# Patient Record
Sex: MALE | Race: BLACK OR AFRICAN AMERICAN | NOT HISPANIC OR LATINO | URBAN - METROPOLITAN AREA
[De-identification: names, ages, dates, MRNs, and addresses within clinical notes are randomized per-mention and may not be internally consistent; named-entity substitution may affect disease eponyms.]

---

## 2017-07-18 ENCOUNTER — INPATIENT (INPATIENT)
Facility: HOSPITAL | Age: 27
LOS: 4 days | Discharge: HOME | End: 2017-07-23
Attending: HOSPITALIST | Admitting: HOSPITALIST
Payer: COMMERCIAL

## 2017-07-18 VITALS
SYSTOLIC BLOOD PRESSURE: 96 MMHG | HEART RATE: 98 BPM | TEMPERATURE: 100 F | DIASTOLIC BLOOD PRESSURE: 57 MMHG | OXYGEN SATURATION: 95 % | RESPIRATION RATE: 18 BRPM

## 2017-07-18 DIAGNOSIS — J18.9 PNEUMONIA, UNSPECIFIED ORGANISM: ICD-10-CM

## 2017-07-18 DIAGNOSIS — Z98.890 OTHER SPECIFIED POSTPROCEDURAL STATES: Chronic | ICD-10-CM

## 2017-07-18 DIAGNOSIS — A41.9 SEPSIS, UNSPECIFIED ORGANISM: ICD-10-CM

## 2017-07-18 LAB
ALBUMIN SERPL ELPH-MCNC: 4.3 G/DL — SIGNIFICANT CHANGE UP (ref 3.3–5)
ALP SERPL-CCNC: 63 U/L — SIGNIFICANT CHANGE UP (ref 40–120)
ALT FLD-CCNC: 29 U/L — SIGNIFICANT CHANGE UP (ref 4–41)
APTT BLD: 36.6 SEC — SIGNIFICANT CHANGE UP (ref 27.5–37.4)
AST SERPL-CCNC: 31 U/L — SIGNIFICANT CHANGE UP (ref 4–40)
B PERT DNA SPEC QL NAA+PROBE: SIGNIFICANT CHANGE UP
BASE EXCESS BLDV CALC-SCNC: -0.7 MMOL/L — SIGNIFICANT CHANGE UP
BASE EXCESS BLDV CALC-SCNC: 3.9 MMOL/L — SIGNIFICANT CHANGE UP
BASOPHILS # BLD AUTO: 0.09 K/UL — SIGNIFICANT CHANGE UP (ref 0–0.2)
BASOPHILS NFR BLD AUTO: 0.3 % — SIGNIFICANT CHANGE UP (ref 0–2)
BASOPHILS NFR SPEC: 0 % — SIGNIFICANT CHANGE UP (ref 0–2)
BILIRUB SERPL-MCNC: 3.2 MG/DL — HIGH (ref 0.2–1.2)
BLOOD GAS VENOUS - CREATININE: 1.69 MG/DL — HIGH (ref 0.5–1.3)
BLOOD GAS VENOUS - CREATININE: 1.86 MG/DL — HIGH (ref 0.5–1.3)
BUN SERPL-MCNC: 23 MG/DL — SIGNIFICANT CHANGE UP (ref 7–23)
C PNEUM DNA SPEC QL NAA+PROBE: NOT DETECTED — SIGNIFICANT CHANGE UP
CALCIUM SERPL-MCNC: 9.6 MG/DL — SIGNIFICANT CHANGE UP (ref 8.4–10.5)
CHLORIDE BLDV-SCNC: 102 MMOL/L — SIGNIFICANT CHANGE UP (ref 96–108)
CHLORIDE BLDV-SCNC: 104 MMOL/L — SIGNIFICANT CHANGE UP (ref 96–108)
CHLORIDE SERPL-SCNC: 93 MMOL/L — LOW (ref 98–107)
CK MB BLD-MCNC: 1 NG/ML — SIGNIFICANT CHANGE UP (ref 1–6.6)
CK MB BLD-MCNC: SIGNIFICANT CHANGE UP (ref 0–2.5)
CK SERPL-CCNC: 118 U/L — SIGNIFICANT CHANGE UP (ref 30–200)
CO2 SERPL-SCNC: 27 MMOL/L — SIGNIFICANT CHANGE UP (ref 22–31)
CREAT SERPL-MCNC: 1.86 MG/DL — HIGH (ref 0.5–1.3)
EOSINOPHIL # BLD AUTO: 0 K/UL — SIGNIFICANT CHANGE UP (ref 0–0.5)
EOSINOPHIL NFR BLD AUTO: 0 % — SIGNIFICANT CHANGE UP (ref 0–6)
EOSINOPHIL NFR FLD: 0 % — SIGNIFICANT CHANGE UP (ref 0–6)
FLUAV H1 2009 PAND RNA SPEC QL NAA+PROBE: NOT DETECTED — SIGNIFICANT CHANGE UP
FLUAV H1 RNA SPEC QL NAA+PROBE: NOT DETECTED — SIGNIFICANT CHANGE UP
FLUAV H3 RNA SPEC QL NAA+PROBE: NOT DETECTED — SIGNIFICANT CHANGE UP
FLUAV SUBTYP SPEC NAA+PROBE: SIGNIFICANT CHANGE UP
FLUBV RNA SPEC QL NAA+PROBE: NOT DETECTED — SIGNIFICANT CHANGE UP
GAS PNL BLDV: 128 MMOL/L — LOW (ref 136–146)
GAS PNL BLDV: 134 MMOL/L — LOW (ref 136–146)
GLUCOSE BLDV-MCNC: 107 — HIGH (ref 70–99)
GLUCOSE BLDV-MCNC: 121 — HIGH (ref 70–99)
GLUCOSE SERPL-MCNC: 98 MG/DL — SIGNIFICANT CHANGE UP (ref 70–99)
HADV DNA SPEC QL NAA+PROBE: NOT DETECTED — SIGNIFICANT CHANGE UP
HCO3 BLDV-SCNC: 23 MMOL/L — SIGNIFICANT CHANGE UP (ref 20–27)
HCO3 BLDV-SCNC: 26 MMOL/L — SIGNIFICANT CHANGE UP (ref 20–27)
HCOV 229E RNA SPEC QL NAA+PROBE: NOT DETECTED — SIGNIFICANT CHANGE UP
HCOV HKU1 RNA SPEC QL NAA+PROBE: NOT DETECTED — SIGNIFICANT CHANGE UP
HCOV NL63 RNA SPEC QL NAA+PROBE: NOT DETECTED — SIGNIFICANT CHANGE UP
HCOV OC43 RNA SPEC QL NAA+PROBE: NOT DETECTED — SIGNIFICANT CHANGE UP
HCT VFR BLD CALC: 44.6 % — SIGNIFICANT CHANGE UP (ref 39–50)
HCT VFR BLDV CALC: 40.4 % — SIGNIFICANT CHANGE UP (ref 39–51)
HCT VFR BLDV CALC: 49.6 % — SIGNIFICANT CHANGE UP (ref 39–51)
HGB BLD-MCNC: 15.5 G/DL — SIGNIFICANT CHANGE UP (ref 13–17)
HGB BLDV-MCNC: 13.2 G/DL — SIGNIFICANT CHANGE UP (ref 13–17)
HGB BLDV-MCNC: 16.2 G/DL — SIGNIFICANT CHANGE UP (ref 13–17)
HMPV RNA SPEC QL NAA+PROBE: NOT DETECTED — SIGNIFICANT CHANGE UP
HPIV1 RNA SPEC QL NAA+PROBE: NOT DETECTED — SIGNIFICANT CHANGE UP
HPIV2 RNA SPEC QL NAA+PROBE: NOT DETECTED — SIGNIFICANT CHANGE UP
HPIV3 RNA SPEC QL NAA+PROBE: NOT DETECTED — SIGNIFICANT CHANGE UP
HPIV4 RNA SPEC QL NAA+PROBE: NOT DETECTED — SIGNIFICANT CHANGE UP
IMM GRANULOCYTES # BLD AUTO: 1.49 # — SIGNIFICANT CHANGE UP
IMM GRANULOCYTES NFR BLD AUTO: 4.2 % — HIGH (ref 0–1.5)
INR BLD: 1.35 — HIGH (ref 0.88–1.17)
LACTATE BLDV-MCNC: 3 MMOL/L — HIGH (ref 0.5–2)
LACTATE BLDV-MCNC: 3.5 MMOL/L — HIGH (ref 0.5–2)
LYMPHOCYTES # BLD AUTO: 1.02 K/UL — SIGNIFICANT CHANGE UP (ref 1–3.3)
LYMPHOCYTES # BLD AUTO: 2.9 % — LOW (ref 13–44)
LYMPHOCYTES NFR SPEC AUTO: 2 % — LOW (ref 13–44)
M PNEUMO DNA SPEC QL NAA+PROBE: NOT DETECTED — SIGNIFICANT CHANGE UP
MANUAL SMEAR VERIFICATION: SIGNIFICANT CHANGE UP
MCHC RBC-ENTMCNC: 30.3 PG — SIGNIFICANT CHANGE UP (ref 27–34)
MCHC RBC-ENTMCNC: 34.8 % — SIGNIFICANT CHANGE UP (ref 32–36)
MCV RBC AUTO: 87.3 FL — SIGNIFICANT CHANGE UP (ref 80–100)
MONOCYTES # BLD AUTO: 1.79 K/UL — HIGH (ref 0–0.9)
MONOCYTES NFR BLD AUTO: 5 % — SIGNIFICANT CHANGE UP (ref 2–14)
MONOCYTES NFR BLD: 6 % — SIGNIFICANT CHANGE UP (ref 2–9)
MORPHOLOGY BLD-IMP: NORMAL — SIGNIFICANT CHANGE UP
NEUTROPHIL AB SER-ACNC: 66 % — SIGNIFICANT CHANGE UP (ref 43–77)
NEUTROPHILS # BLD AUTO: 31.32 K/UL — HIGH (ref 1.8–7.4)
NEUTROPHILS NFR BLD AUTO: 87.6 % — HIGH (ref 43–77)
NEUTS BAND # BLD: 26 % — HIGH (ref 0–6)
NRBC # FLD: 0 — SIGNIFICANT CHANGE UP
PCO2 BLDV: 40 MMHG — LOW (ref 41–51)
PCO2 BLDV: 43 MMHG — SIGNIFICANT CHANGE UP (ref 41–51)
PH BLDV: 7.39 PH — SIGNIFICANT CHANGE UP (ref 7.32–7.43)
PH BLDV: 7.43 PH — SIGNIFICANT CHANGE UP (ref 7.32–7.43)
PLATELET # BLD AUTO: 285 K/UL — SIGNIFICANT CHANGE UP (ref 150–400)
PLATELET COUNT - ESTIMATE: NORMAL — SIGNIFICANT CHANGE UP
PMV BLD: 9.7 FL — SIGNIFICANT CHANGE UP (ref 7–13)
PO2 BLDV: 47 MMHG — HIGH (ref 35–40)
PO2 BLDV: < 24 MMHG — LOW (ref 35–40)
POTASSIUM BLDV-SCNC: 4.2 MMOL/L — SIGNIFICANT CHANGE UP (ref 3.4–4.5)
POTASSIUM BLDV-SCNC: 4.3 MMOL/L — SIGNIFICANT CHANGE UP (ref 3.4–4.5)
POTASSIUM SERPL-MCNC: 4.6 MMOL/L — SIGNIFICANT CHANGE UP (ref 3.5–5.3)
POTASSIUM SERPL-SCNC: 4.6 MMOL/L — SIGNIFICANT CHANGE UP (ref 3.5–5.3)
PROT SERPL-MCNC: 8.3 G/DL — SIGNIFICANT CHANGE UP (ref 6–8.3)
PROTHROM AB SERPL-ACNC: 15.2 SEC — HIGH (ref 9.8–13.1)
RBC # BLD: 5.11 M/UL — SIGNIFICANT CHANGE UP (ref 4.2–5.8)
RBC # FLD: 11.4 % — SIGNIFICANT CHANGE UP (ref 10.3–14.5)
RSV RNA SPEC QL NAA+PROBE: NOT DETECTED — SIGNIFICANT CHANGE UP
RV+EV RNA SPEC QL NAA+PROBE: NOT DETECTED — SIGNIFICANT CHANGE UP
SAO2 % BLDV: 32.4 % — LOW (ref 60–85)
SAO2 % BLDV: 78 % — SIGNIFICANT CHANGE UP (ref 60–85)
SODIUM SERPL-SCNC: 134 MMOL/L — LOW (ref 135–145)
TROPONIN T SERPL-MCNC: < 0.06 NG/ML — SIGNIFICANT CHANGE UP (ref 0–0.06)
WBC # BLD: 35.71 K/UL — HIGH (ref 3.8–10.5)
WBC # FLD AUTO: 35.71 K/UL — HIGH (ref 3.8–10.5)

## 2017-07-18 PROCEDURE — 71020: CPT | Mod: 26

## 2017-07-18 PROCEDURE — 99223 1ST HOSP IP/OBS HIGH 75: CPT

## 2017-07-18 RX ORDER — AZITHROMYCIN 500 MG/1
500 TABLET, FILM COATED ORAL EVERY 24 HOURS
Qty: 0 | Refills: 0 | Status: DISCONTINUED | OUTPATIENT
Start: 2017-07-19 | End: 2017-07-21

## 2017-07-18 RX ORDER — SODIUM CHLORIDE 9 MG/ML
2000 INJECTION INTRAMUSCULAR; INTRAVENOUS; SUBCUTANEOUS ONCE
Qty: 0 | Refills: 0 | Status: COMPLETED | OUTPATIENT
Start: 2017-07-18 | End: 2017-07-18

## 2017-07-18 RX ORDER — SODIUM CHLORIDE 9 MG/ML
1000 INJECTION INTRAMUSCULAR; INTRAVENOUS; SUBCUTANEOUS
Qty: 0 | Refills: 0 | Status: DISCONTINUED | OUTPATIENT
Start: 2017-07-18 | End: 2017-07-23

## 2017-07-18 RX ORDER — CEFTRIAXONE 500 MG/1
1 INJECTION, POWDER, FOR SOLUTION INTRAMUSCULAR; INTRAVENOUS EVERY 24 HOURS
Qty: 0 | Refills: 0 | Status: DISCONTINUED | OUTPATIENT
Start: 2017-07-19 | End: 2017-07-21

## 2017-07-18 RX ORDER — IPRATROPIUM/ALBUTEROL SULFATE 18-103MCG
3 AEROSOL WITH ADAPTER (GRAM) INHALATION EVERY 6 HOURS
Qty: 0 | Refills: 0 | Status: DISCONTINUED | OUTPATIENT
Start: 2017-07-18 | End: 2017-07-23

## 2017-07-18 RX ORDER — CEFTRIAXONE 500 MG/1
1 INJECTION, POWDER, FOR SOLUTION INTRAMUSCULAR; INTRAVENOUS ONCE
Qty: 0 | Refills: 0 | Status: COMPLETED | OUTPATIENT
Start: 2017-07-18 | End: 2017-07-18

## 2017-07-18 RX ORDER — ONDANSETRON 8 MG/1
4 TABLET, FILM COATED ORAL ONCE
Qty: 0 | Refills: 0 | Status: COMPLETED | OUTPATIENT
Start: 2017-07-18 | End: 2017-07-18

## 2017-07-18 RX ORDER — ONDANSETRON 8 MG/1
4 TABLET, FILM COATED ORAL EVERY 4 HOURS
Qty: 0 | Refills: 0 | Status: DISCONTINUED | OUTPATIENT
Start: 2017-07-18 | End: 2017-07-23

## 2017-07-18 RX ORDER — ACETAMINOPHEN 500 MG
650 TABLET ORAL EVERY 6 HOURS
Qty: 0 | Refills: 0 | Status: DISCONTINUED | OUTPATIENT
Start: 2017-07-18 | End: 2017-07-23

## 2017-07-18 RX ORDER — ACETAMINOPHEN 500 MG
650 TABLET ORAL ONCE
Qty: 0 | Refills: 0 | Status: COMPLETED | OUTPATIENT
Start: 2017-07-18 | End: 2017-07-18

## 2017-07-18 RX ORDER — MORPHINE SULFATE 50 MG/1
2 CAPSULE, EXTENDED RELEASE ORAL EVERY 4 HOURS
Qty: 0 | Refills: 0 | Status: DISCONTINUED | OUTPATIENT
Start: 2017-07-18 | End: 2017-07-23

## 2017-07-18 RX ORDER — AZITHROMYCIN 500 MG/1
500 TABLET, FILM COATED ORAL ONCE
Qty: 0 | Refills: 0 | Status: COMPLETED | OUTPATIENT
Start: 2017-07-18 | End: 2017-07-18

## 2017-07-18 RX ADMIN — SODIUM CHLORIDE 2000 MILLILITER(S): 9 INJECTION INTRAMUSCULAR; INTRAVENOUS; SUBCUTANEOUS at 19:12

## 2017-07-18 RX ADMIN — AZITHROMYCIN 250 MILLIGRAM(S): 500 TABLET, FILM COATED ORAL at 19:55

## 2017-07-18 RX ADMIN — CEFTRIAXONE 100 GRAM(S): 500 INJECTION, POWDER, FOR SOLUTION INTRAMUSCULAR; INTRAVENOUS at 19:12

## 2017-07-18 RX ADMIN — Medication 650 MILLIGRAM(S): at 19:12

## 2017-07-18 RX ADMIN — Medication 650 MILLIGRAM(S): at 22:16

## 2017-07-18 RX ADMIN — ONDANSETRON 4 MILLIGRAM(S): 8 TABLET, FILM COATED ORAL at 19:12

## 2017-07-18 NOTE — ED PROVIDER NOTE - MEDICAL DECISION MAKING DETAILS
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 Rectal temp, tylenol, zofran, IVF, CXR. Concern for pneumonia.

## 2017-07-18 NOTE — ED PROVIDER NOTE - OBJECTIVE STATEMENT
27yo male pmh complement deficiency, pneumonia s/p surgery and chest tube 10y ago p/w subj fevers, chills, vomiting, left sided chest pain since last night. Dyspneic today while walking, collapsed with no LOC or trauma. Pt denies abdominal pain, diarrhea, recent travel or illness, LE swelling

## 2017-07-18 NOTE — ED PROVIDER NOTE - ATTENDING CONTRIBUTION TO CARE
Pt was seen and evaluated by me. Pt states starting yesterday having a cough with fevers typical of previous pneumonia. Pt admits to left sided chest discomfort with cough. Pt also notes nausea and vomiting, last episode this morning. Pt denies any SOB or abd pain. Coarse breath sounds on left. RRR. Abd soft, non-tender.

## 2017-07-18 NOTE — ED ADULT NURSE NOTE - OBJECTIVE STATEMENT
Pt a&ox3 c/o sob, cough, fevers since yesterday, pt reports mild chest pain related to breathing and cough. Pt breathing even and mildly labored on 2L NC. Pt denies cp/discomfort at rest, denies headache/dizziness. Abdomen is soft, non-tender, non-distended. Skin is cool dry and intact. IVL placed, labs sent. Will continue to monitor.

## 2017-07-18 NOTE — H&P ADULT - NSHPSOCIALHISTORY_GEN_ALL_CORE
Social History:    Marital Status:  (   )    ( x ) Single    (   )    (  )   Occupation: sales   Lives with: ( x ) 2 roommates     Substance Use (street drugs): (x  ) never used  (  ) other:  Tobacco Usage:  (  x ) never smoked   (   ) former smoker   (   ) current smoker  (     ) pack year  (        ) last cigarette date  Alcohol Usage: social   Sexual History:       (     ) Advanced Directives: (   x  ) None    (      ) DNR    (     ) DNI    (     ) Health Care Proxy:

## 2017-07-18 NOTE — ED PROVIDER NOTE - PROGRESS NOTE DETAILS
104 rectal temp. Tylenol, IVF and ceftriaxone/azithro ordered. Retrocardiac opacity on cxr 104 rectal temp. Tylenol, IVF and ceftriaxone/azithro ordered. Retrocardiac opacity on cxr. WBC 35. Pt unsure of primary care name, states that he does not come to [facility]. Admitting to hospitalist

## 2017-07-18 NOTE — H&P ADULT - HISTORY OF PRESENT ILLNESS
27 yo M with h/o complement deficiency, prior PNA c/b chest tube placement 10 yrs ago p/w fever. Pt had subjective fever yesterday followed by episode of NBNB vomiting. This morning he woke up with chest pain, cough and SOB. He has no known sick contacts or recent travel.     ED VS: 96/57  98    99.6  18  95% on RA  Pt was given ceftriaxone, azithromycin, tylenol, zofran and 2L NS 25 yo M with h/o complement deficiency, prior PNA c/b chest tube placement 10 yrs ago p/w fever. Pt had subjective fever yesterday followed by episode of NBNB vomiting. No abd pain, no diarrhea. This morning he woke up with pleuritic chest pain, cough productive clear sputum and SOB. He has no known sick contacts or recent travel. no hemoptysis, leg swelling or pain.     ED VS: 96/57  98    99.6  18  95% on RA  Pt was given ceftriaxone, azithromycin, tylenol, zofran and 2L NS

## 2017-07-18 NOTE — H&P ADULT - NSHPREVIEWOFSYSTEMS_GEN_ALL_CORE
REVIEW OF SYSTEMS:    CONSTITUTIONAL: + fevers, no weight loss  EYES/ENT: No visual changes;  No dysphagia or odynophagia, no tinnitus  NECK: No pain or stiffness  RESPIRATORY: + cough, wheezing, hemoptysis; No shortness of breath  CARDIOVASCULAR: + Chest pain, no palpitations; No lower extremity edema  GASTROINTESTINAL: No abdominal or epigastric pain. +N/V, or hematemesis; No diarrhea or constipation. No melena or hematochezia.  MUSCULOSKELETAL: No joint pain, swelling, erythema or warmth, no back pain  GENITOURINARY: No dysuria, frequency or hematuria, no suprapubic pain  NEUROLOGICAL: No numbness or weakness, no headache, no syncope, no gait abnormalities   SKIN: No itching, burning, rashes, or lesions   All other review of systems is negative unless indicated above.

## 2017-07-18 NOTE — H&P ADULT - PROBLEM SELECTOR PLAN 1
- Sepsis 2/2 PNA in setting of complement deficiency   - Continue ceftriaxone and azithromycin for now  - f/u blood culture  - supportive care- nebs, cough suppressant, Tylenol, fluids. morphine for chest pain

## 2017-07-18 NOTE — H&P ADULT - NSHPPHYSICALEXAM_GEN_ALL_CORE
GENERAL: No acute distress, well-developed  HEAD:  Atraumatic, Normocephalic  ENT: EOMI, PERRLA, conjunctiva and sclera clear, Neck supple, No JVD, moist mucosa, no pharyngeal erythema, no tonsillar enlargement or exudate  CHEST/LUNG: Clear to auscultation bilaterally; No wheeze, equal breath sounds bilaterally   HEART: Regular rate and rhythm; No murmurs, rubs, or gallops  ABDOMEN: Soft, Nontender, Nondistended; Bowel sounds present, no organomegaly  EXTREMITIES:  2+ Peripheral Pulses, No clubbing, cyanosis, or edema  PSYCH: AAOx3  NEUROLOGY: non-focal, cranial nerves intact  SKIN: Normal color, No rashes or lesions

## 2017-07-18 NOTE — ED PROVIDER NOTE - CARE PLAN
Principal Discharge DX:	Sepsis, due to unspecified organism  Secondary Diagnosis:	Pneumonia of left lung due to infectious organism, unspecified part of lung

## 2017-07-18 NOTE — H&P ADULT - ASSESSMENT
25 yo M with h/o complement deficiency, prior PNA c/b chest tube placement 10 yrs ago p/w fever, cough and SOB

## 2017-07-19 LAB
ALBUMIN SERPL ELPH-MCNC: 3.5 G/DL — SIGNIFICANT CHANGE UP (ref 3.3–5)
ALP SERPL-CCNC: 69 U/L — SIGNIFICANT CHANGE UP (ref 40–120)
ALT FLD-CCNC: 35 U/L — SIGNIFICANT CHANGE UP (ref 4–41)
AST SERPL-CCNC: 39 U/L — SIGNIFICANT CHANGE UP (ref 4–40)
BASOPHILS # BLD AUTO: 0.03 K/UL — SIGNIFICANT CHANGE UP (ref 0–0.2)
BASOPHILS NFR BLD AUTO: 0.1 % — SIGNIFICANT CHANGE UP (ref 0–2)
BILIRUB SERPL-MCNC: 1.8 MG/DL — HIGH (ref 0.2–1.2)
BUN SERPL-MCNC: 21 MG/DL — SIGNIFICANT CHANGE UP (ref 7–23)
CALCIUM SERPL-MCNC: 8.4 MG/DL — SIGNIFICANT CHANGE UP (ref 8.4–10.5)
CHLORIDE SERPL-SCNC: 98 MMOL/L — SIGNIFICANT CHANGE UP (ref 98–107)
CO2 SERPL-SCNC: 23 MMOL/L — SIGNIFICANT CHANGE UP (ref 22–31)
CREAT SERPL-MCNC: 1.42 MG/DL — HIGH (ref 0.5–1.3)
EOSINOPHIL # BLD AUTO: 0 K/UL — SIGNIFICANT CHANGE UP (ref 0–0.5)
EOSINOPHIL NFR BLD AUTO: 0 % — SIGNIFICANT CHANGE UP (ref 0–6)
GLUCOSE SERPL-MCNC: 60 MG/DL — LOW (ref 70–99)
GRAM STN SPT: SIGNIFICANT CHANGE UP
HCT VFR BLD CALC: 43.2 % — SIGNIFICANT CHANGE UP (ref 39–50)
HGB BLD-MCNC: 14.3 G/DL — SIGNIFICANT CHANGE UP (ref 13–17)
IMM GRANULOCYTES # BLD AUTO: 1.59 # — SIGNIFICANT CHANGE UP
IMM GRANULOCYTES NFR BLD AUTO: 4.1 % — HIGH (ref 0–1.5)
LYMPHOCYTES # BLD AUTO: 2.27 K/UL — SIGNIFICANT CHANGE UP (ref 1–3.3)
LYMPHOCYTES # BLD AUTO: 5.8 % — LOW (ref 13–44)
MAGNESIUM SERPL-MCNC: 1.2 MG/DL — LOW (ref 1.6–2.6)
MCHC RBC-ENTMCNC: 29.5 PG — SIGNIFICANT CHANGE UP (ref 27–34)
MCHC RBC-ENTMCNC: 33.1 % — SIGNIFICANT CHANGE UP (ref 32–36)
MCV RBC AUTO: 89.3 FL — SIGNIFICANT CHANGE UP (ref 80–100)
MONOCYTES # BLD AUTO: 1.48 K/UL — HIGH (ref 0–0.9)
MONOCYTES NFR BLD AUTO: 3.8 % — SIGNIFICANT CHANGE UP (ref 2–14)
NEUTROPHILS # BLD AUTO: 33.76 K/UL — HIGH (ref 1.8–7.4)
NEUTROPHILS NFR BLD AUTO: 86.2 % — HIGH (ref 43–77)
NRBC # FLD: 0 — SIGNIFICANT CHANGE UP
PHOSPHATE SERPL-MCNC: 3.1 MG/DL — SIGNIFICANT CHANGE UP (ref 2.5–4.5)
PLATELET # BLD AUTO: 238 K/UL — SIGNIFICANT CHANGE UP (ref 150–400)
PMV BLD: 9.9 FL — SIGNIFICANT CHANGE UP (ref 7–13)
POTASSIUM SERPL-MCNC: 4.9 MMOL/L — SIGNIFICANT CHANGE UP (ref 3.5–5.3)
POTASSIUM SERPL-SCNC: 4.9 MMOL/L — SIGNIFICANT CHANGE UP (ref 3.5–5.3)
PROT SERPL-MCNC: 7.1 G/DL — SIGNIFICANT CHANGE UP (ref 6–8.3)
RBC # BLD: 4.84 M/UL — SIGNIFICANT CHANGE UP (ref 4.2–5.8)
RBC # FLD: 11.6 % — SIGNIFICANT CHANGE UP (ref 10.3–14.5)
SODIUM SERPL-SCNC: 138 MMOL/L — SIGNIFICANT CHANGE UP (ref 135–145)
SPECIMEN SOURCE: SIGNIFICANT CHANGE UP
WBC # BLD: 39.13 K/UL — HIGH (ref 3.8–10.5)
WBC # FLD AUTO: 39.13 K/UL — HIGH (ref 3.8–10.5)

## 2017-07-19 PROCEDURE — 99233 SBSQ HOSP IP/OBS HIGH 50: CPT | Mod: GC

## 2017-07-19 RX ORDER — ACETAMINOPHEN 500 MG
650 TABLET ORAL EVERY 6 HOURS
Qty: 0 | Refills: 0 | Status: DISCONTINUED | OUTPATIENT
Start: 2017-07-19 | End: 2017-07-23

## 2017-07-19 RX ADMIN — Medication 3 MILLILITER(S): at 16:50

## 2017-07-19 RX ADMIN — Medication 650 MILLIGRAM(S): at 20:56

## 2017-07-19 RX ADMIN — MORPHINE SULFATE 2 MILLIGRAM(S): 50 CAPSULE, EXTENDED RELEASE ORAL at 06:25

## 2017-07-19 RX ADMIN — MORPHINE SULFATE 2 MILLIGRAM(S): 50 CAPSULE, EXTENDED RELEASE ORAL at 00:28

## 2017-07-19 RX ADMIN — CEFTRIAXONE 100 GRAM(S): 500 INJECTION, POWDER, FOR SOLUTION INTRAMUSCULAR; INTRAVENOUS at 19:21

## 2017-07-19 RX ADMIN — MORPHINE SULFATE 2 MILLIGRAM(S): 50 CAPSULE, EXTENDED RELEASE ORAL at 06:05

## 2017-07-19 RX ADMIN — Medication 650 MILLIGRAM(S): at 13:49

## 2017-07-19 RX ADMIN — AZITHROMYCIN 250 MILLIGRAM(S): 500 TABLET, FILM COATED ORAL at 20:42

## 2017-07-19 RX ADMIN — Medication 650 MILLIGRAM(S): at 14:20

## 2017-07-19 NOTE — PROGRESS NOTE ADULT - PROBLEM SELECTOR PLAN 1
- Sepsis 2/2 PNA in setting of complement deficiency   - Continue ceftriaxone and azithromycin for now  - f/u blood culture  - supportive care- nebs, cough suppressant, Tylenol, fluids. morphine for chest pain - Sepsis 2/2 PNA in setting of complement deficiency   - Continue ceftriaxone and azithromycin for CAP  - f/u blood culture  -order sputum culture  -RVP neg  - supportive care- nebs, cough suppressant, Tylenol, fluids. morphine for chest pain

## 2017-07-19 NOTE — PROGRESS NOTE ADULT - SUBJECTIVE AND OBJECTIVE BOX
Patient is a 26y old  Male who presents with a chief complaint of Fever (18 Jul 2017 21:40)        SUBJECTIVE / OVERNIGHT EVENTS: Reports chest has improved but still persists. No n/v/d, reports last was soft prior to admission.      MEDICATIONS  (STANDING):  sodium chloride 0.9%. 1000 milliLiter(s) (150 mL/Hr) IV Continuous <Continuous>  cefTRIAXone   IVPB 1 Gram(s) IV Intermittent every 24 hours  azithromycin  IVPB 500 milliGRAM(s) IV Intermittent every 24 hours    MEDICATIONS  (PRN):  acetaminophen   Tablet 650 milliGRAM(s) Oral every 6 hours PRN For Temp greater than 38 C (100.4 F)  morphine  - Injectable 2 milliGRAM(s) IV Push every 4 hours PRN Severe Pain (7 - 10)  ALBUTerol/ipratropium for Nebulization 3 milliLiter(s) Nebulizer every 6 hours PRN Shortness of Breath and/or Wheezing  benzonatate 100 milliGRAM(s) Oral every 8 hours PRN Cough  ondansetron Injectable 4 milliGRAM(s) IV Push every 4 hours PRN Nausea and/or Vomiting      Vital Signs Last 24 Hrs  T(C): 36.8 (07-19-17 @ 05:34), Max: 40 (07-18-17 @ 19:11)  HR: 67 (07-19-17 @ 05:34) (67 - 98)  BP: 100/52 (07-19-17 @ 05:34) (93/53 - 110/62)  RR: 18 (07-19-17 @ 05:34) (16 - 19)  SpO2: 100% (07-19-17 @ 05:34) (95% - 100%)  CAPILLARY BLOOD GLUCOSE        I&O's Summary      PHYSICAL EXAM  GENERAL: NAD, well-developed  HEAD:  Atraumatic, Normocephalic  EYES: EOMI, PERRLA, conjunctiva and sclera clear  NECK: Supple, No JVD  CHEST/LUNG: Clear to auscultation bilaterally; No wheeze  HEART: Regular rate and rhythm; No murmurs, rubs, or gallops  ABDOMEN: Soft, Nontender, Nondistended; Bowel sounds present; scar on left axillary line  EXTREMITIES:  2+ Peripheral Pulses, No clubbing, cyanosis, or edema  PSYCH: AAOx3  SKIN: No rashes or lesions    LABS:                        14.3   39.13 )-----------( 238      ( 19 Jul 2017 05:58 )             43.2     07-18    134<L>  |  93<L>  |  23  ----------------------------<  98  4.6   |  27  |  1.86<H>    Ca    9.6      18 Jul 2017 18:31    TPro  8.3  /  Alb  4.3  /  TBili  3.2<H>  /  DBili  x   /  AST  31  /  ALT  29  /  AlkPhos  63  07-18    PT/INR - ( 18 Jul 2017 18:31 )   PT: 15.2 SEC;   INR: 1.35          PTT - ( 18 Jul 2017 18:31 )  PTT:36.6 SEC  CARDIAC MARKERS ( 18 Jul 2017 18:31 )  x     / < 0.06 ng/mL / 118 u/L / 1.00 ng/mL / x              RADIOLOGY & ADDITIONAL TESTS:    Imaging Personally Reviewed:  Consultant(s) Notes Reviewed:    Care Discussed with Consultants/Other Providers: Patient is a 26y old  Male who presents with a chief complaint of Fever (18 Jul 2017 21:40)        SUBJECTIVE / OVERNIGHT EVENTS: Reports lower left sided chest pain has improved but still persists, in region of prior drainage tube, worse with cough or movement, dissipates with rest. Reports last BM was soft prior to admission. Reports rectal temp in ED of 104 Denies abd pain, leg, n/v/d.      MEDICATIONS  (STANDING):  sodium chloride 0.9%. 1000 milliLiter(s) (150 mL/Hr) IV Continuous <Continuous>  cefTRIAXone   IVPB 1 Gram(s) IV Intermittent every 24 hours  azithromycin  IVPB 500 milliGRAM(s) IV Intermittent every 24 hours    MEDICATIONS  (PRN):  acetaminophen   Tablet 650 milliGRAM(s) Oral every 6 hours PRN For Temp greater than 38 C (100.4 F)  morphine  - Injectable 2 milliGRAM(s) IV Push every 4 hours PRN Severe Pain (7 - 10)  ALBUTerol/ipratropium for Nebulization 3 milliLiter(s) Nebulizer every 6 hours PRN Shortness of Breath and/or Wheezing  benzonatate 100 milliGRAM(s) Oral every 8 hours PRN Cough  ondansetron Injectable 4 milliGRAM(s) IV Push every 4 hours PRN Nausea and/or Vomiting      Vital Signs Last 24 Hrs  T(C): 36.8 (07-19-17 @ 05:34), Max: 40 (07-18-17 @ 19:11)  HR: 67 (07-19-17 @ 05:34) (67 - 98)  BP: 100/52 (07-19-17 @ 05:34) (93/53 - 110/62)  RR: 18 (07-19-17 @ 05:34) (16 - 19)  SpO2: 100% (07-19-17 @ 05:34) (95% - 100%)  CAPILLARY BLOOD GLUCOSE        I&O's Summary      PHYSICAL EXAM  GENERAL: NAD, well-developed  HEAD:  Atraumatic, Normocephalic  EYES: EOMI, PERRLA, conjunctiva and sclera clear  NECK: Supple, No JVD  CHEST/LUNG: b/l rhonchi through L>R; No wheeze  HEART: Regular rate and rhythm; No murmurs, rubs, or gallops  ABDOMEN: Soft, Nontender, Nondistended; Bowel sounds present;   EXTREMITIES:  2+ Peripheral Pulses, No clubbing, cyanosis, or edema  PSYCH: AAOx3  SKIN: No rashes or lesions, scar on left axillary line    LABS:                        14.3   39.13 )-----------( 238      ( 19 Jul 2017 05:58 )             43.2     07-18    134<L>  |  93<L>  |  23  ----------------------------<  98  4.6   |  27  |  1.86<H>    Ca    9.6      18 Jul 2017 18:31    TPro  8.3  /  Alb  4.3  /  TBili  3.2<H>  /  DBili  x   /  AST  31  /  ALT  29  /  AlkPhos  63  07-18    PT/INR - ( 18 Jul 2017 18:31 )   PT: 15.2 SEC;   INR: 1.35          PTT - ( 18 Jul 2017 18:31 )  PTT:36.6 SEC  CARDIAC MARKERS ( 18 Jul 2017 18:31 )  x     / < 0.06 ng/mL / 118 u/L / 1.00 ng/mL / x              RADIOLOGY & ADDITIONAL TESTS:    Imaging Personally Reviewed:  Consultant(s) Notes Reviewed:    Care Discussed with Consultants/Other Providers: Patient is a 26y old  Male who presents with a chief complaint of Fever (18 Jul 2017 21:40)        SUBJECTIVE / OVERNIGHT EVENTS: Reports lower left sided chest pain has improved but still persists, in region of prior drainage tube, worse with cough or movement, dissipates with rest. Reports last BM was soft prior to admission. Reports rectal temp in ED of 104 Denies abd pain, leg, n/v/d.      MEDICATIONS  (STANDING):  sodium chloride 0.9%. 1000 milliLiter(s) (150 mL/Hr) IV Continuous <Continuous>  cefTRIAXone   IVPB 1 Gram(s) IV Intermittent every 24 hours  azithromycin  IVPB 500 milliGRAM(s) IV Intermittent every 24 hours    MEDICATIONS  (PRN):  acetaminophen   Tablet 650 milliGRAM(s) Oral every 6 hours PRN For Temp greater than 38 C (100.4 F)  morphine  - Injectable 2 milliGRAM(s) IV Push every 4 hours PRN Severe Pain (7 - 10)  ALBUTerol/ipratropium for Nebulization 3 milliLiter(s) Nebulizer every 6 hours PRN Shortness of Breath and/or Wheezing  benzonatate 100 milliGRAM(s) Oral every 8 hours PRN Cough  ondansetron Injectable 4 milliGRAM(s) IV Push every 4 hours PRN Nausea and/or Vomiting      Vital Signs Last 24 Hrs  T(C): 36.8 (07-19-17 @ 05:34), Max: 40 (07-18-17 @ 19:11)  HR: 67 (07-19-17 @ 05:34) (67 - 98)  BP: 100/52 (07-19-17 @ 05:34) (93/53 - 110/62)  RR: 18 (07-19-17 @ 05:34) (16 - 19)  SpO2: 100% (07-19-17 @ 05:34) (95% - 100%)  CAPILLARY BLOOD GLUCOSE        I&O's Summary      PHYSICAL EXAM  GENERAL: NAD, well-developed  HEAD:  Atraumatic, Normocephalic  EYES: EOMI, PERRLA, conjunctiva and sclera clear  NECK: Supple, No JVD  CHEST/LUNG: b/l rhonchi through L>R; No wheeze  HEART: Regular rate and rhythm; No murmurs, rubs, or gallops  ABDOMEN: Soft, Nontender, Nondistended; Bowel sounds present;   EXTREMITIES:  2+ Peripheral Pulses, No clubbing, cyanosis, or edema  PSYCH: AAOx3  SKIN: No rashes or lesions, scar on left axillary line    LABS:                        14.3   39.13 )-----------( 238      ( 19 Jul 2017 05:58 )             43.2     07-18    134<L>  |  93<L>  |  23  ----------------------------<  98  4.6   |  27  |  1.86<H>    Ca    9.6      18 Jul 2017 18:31    TPro  8.3  /  Alb  4.3  /  TBili  3.2<H>  /  DBili  x   /  AST  31  /  ALT  29  /  AlkPhos  63  07-18    PT/INR - ( 18 Jul 2017 18:31 )   PT: 15.2 SEC;   INR: 1.35          PTT - ( 18 Jul 2017 18:31 )  PTT:36.6 SEC  CARDIAC MARKERS ( 18 Jul 2017 18:31 )  x     / < 0.06 ng/mL / 118 u/L / 1.00 ng/mL / x              RADIOLOGY & ADDITIONAL TESTS:    Imaging Personally Reviewed:< from: Xray Chest 2 Views PA/Lat (07.18.17 @ 18:51) >    IMPRESSION:   Questionable retrocardiac opacity, may represent infiltrate versus   overlapping osseous structures. Please correlate clinically.       Consultant(s) Notes Reviewed:    Care Discussed with Consultants/Other Providers:

## 2017-07-19 NOTE — PROGRESS NOTE ADULT - ASSESSMENT
27 yo M with h/o complement deficiency, prior PNA c/b chest tube placement 10 yrs ago p/w fever, cough and SOB 25 yo M with h/o complement deficiency, prior PNA c/b chest tube placement 10 yrs ago p/w fever, cough and SOB 2/2 sepsis like 2/2 PNA 25 yo M with h/o complement deficiency, asthma, prior PNA c/b chest tube placement 10 yrs ago p/w fever, cough and SOB 2/2 sepsis like 2/2 PNA 27 yo M with h/o complement deficiency, asthma, prior PNA c/b chest tube placement 10 yrs ago p/w fever, cough and SOB 2/2 severe sepsis 2/2 CAP.

## 2017-07-20 DIAGNOSIS — A41.9 SEPSIS, UNSPECIFIED ORGANISM: ICD-10-CM

## 2017-07-20 DIAGNOSIS — D84.1 DEFECTS IN THE COMPLEMENT SYSTEM: ICD-10-CM

## 2017-07-20 LAB
BASOPHILS # BLD AUTO: 0.06 K/UL — SIGNIFICANT CHANGE UP (ref 0–0.2)
BASOPHILS NFR BLD AUTO: 0.2 % — SIGNIFICANT CHANGE UP (ref 0–2)
BUN SERPL-MCNC: 14 MG/DL — SIGNIFICANT CHANGE UP (ref 7–23)
C3 SERPL-MCNC: 32.1 MG/DL — LOW (ref 90–180)
C4 SERPL-MCNC: 34 MG/DL — SIGNIFICANT CHANGE UP (ref 10–40)
CALCIUM SERPL-MCNC: 9.2 MG/DL — SIGNIFICANT CHANGE UP (ref 8.4–10.5)
CHLORIDE SERPL-SCNC: 101 MMOL/L — SIGNIFICANT CHANGE UP (ref 98–107)
CO2 SERPL-SCNC: 23 MMOL/L — SIGNIFICANT CHANGE UP (ref 22–31)
CREAT SERPL-MCNC: 1.2 MG/DL — SIGNIFICANT CHANGE UP (ref 0.5–1.3)
EOSINOPHIL # BLD AUTO: 0.07 K/UL — SIGNIFICANT CHANGE UP (ref 0–0.5)
EOSINOPHIL NFR BLD AUTO: 0.3 % — SIGNIFICANT CHANGE UP (ref 0–6)
GLUCOSE SERPL-MCNC: 85 MG/DL — SIGNIFICANT CHANGE UP (ref 70–99)
HCT VFR BLD CALC: 37.6 % — LOW (ref 39–50)
HGB BLD-MCNC: 12.7 G/DL — LOW (ref 13–17)
IGA FLD-MCNC: 166 MG/DL — SIGNIFICANT CHANGE UP (ref 70–400)
IGG FLD-MCNC: 1245 MG/DL — SIGNIFICANT CHANGE UP (ref 700–1600)
IGM SERPL-MCNC: 67 MG/DL — SIGNIFICANT CHANGE UP (ref 40–230)
IMM GRANULOCYTES # BLD AUTO: 0.64 # — SIGNIFICANT CHANGE UP
IMM GRANULOCYTES NFR BLD AUTO: 2.4 % — HIGH (ref 0–1.5)
LYMPHOCYTES # BLD AUTO: 2.05 K/UL — SIGNIFICANT CHANGE UP (ref 1–3.3)
LYMPHOCYTES # BLD AUTO: 7.7 % — LOW (ref 13–44)
MAGNESIUM SERPL-MCNC: 1.7 MG/DL — SIGNIFICANT CHANGE UP (ref 1.6–2.6)
MCHC RBC-ENTMCNC: 29.9 PG — SIGNIFICANT CHANGE UP (ref 27–34)
MCHC RBC-ENTMCNC: 33.8 % — SIGNIFICANT CHANGE UP (ref 32–36)
MCV RBC AUTO: 88.5 FL — SIGNIFICANT CHANGE UP (ref 80–100)
MONOCYTES # BLD AUTO: 1.31 K/UL — HIGH (ref 0–0.9)
MONOCYTES NFR BLD AUTO: 4.9 % — SIGNIFICANT CHANGE UP (ref 2–14)
NEUTROPHILS # BLD AUTO: 22.38 K/UL — HIGH (ref 1.8–7.4)
NEUTROPHILS NFR BLD AUTO: 84.5 % — HIGH (ref 43–77)
NRBC # FLD: 0 — SIGNIFICANT CHANGE UP
PHOSPHATE SERPL-MCNC: 2.6 MG/DL — SIGNIFICANT CHANGE UP (ref 2.5–4.5)
PLATELET # BLD AUTO: 227 K/UL — SIGNIFICANT CHANGE UP (ref 150–400)
PMV BLD: 10.2 FL — SIGNIFICANT CHANGE UP (ref 7–13)
POTASSIUM SERPL-MCNC: 4.3 MMOL/L — SIGNIFICANT CHANGE UP (ref 3.5–5.3)
POTASSIUM SERPL-SCNC: 4.3 MMOL/L — SIGNIFICANT CHANGE UP (ref 3.5–5.3)
RBC # BLD: 4.25 M/UL — SIGNIFICANT CHANGE UP (ref 4.2–5.8)
RBC # FLD: 11.6 % — SIGNIFICANT CHANGE UP (ref 10.3–14.5)
SODIUM SERPL-SCNC: 138 MMOL/L — SIGNIFICANT CHANGE UP (ref 135–145)
WBC # BLD: 26.51 K/UL — HIGH (ref 3.8–10.5)
WBC # FLD AUTO: 26.51 K/UL — HIGH (ref 3.8–10.5)

## 2017-07-20 PROCEDURE — 99233 SBSQ HOSP IP/OBS HIGH 50: CPT | Mod: GC

## 2017-07-20 RX ADMIN — MORPHINE SULFATE 2 MILLIGRAM(S): 50 CAPSULE, EXTENDED RELEASE ORAL at 16:54

## 2017-07-20 RX ADMIN — Medication 650 MILLIGRAM(S): at 08:22

## 2017-07-20 RX ADMIN — Medication 650 MILLIGRAM(S): at 09:00

## 2017-07-20 RX ADMIN — Medication 3 MILLILITER(S): at 14:48

## 2017-07-20 RX ADMIN — CEFTRIAXONE 100 GRAM(S): 500 INJECTION, POWDER, FOR SOLUTION INTRAMUSCULAR; INTRAVENOUS at 19:13

## 2017-07-20 RX ADMIN — MORPHINE SULFATE 2 MILLIGRAM(S): 50 CAPSULE, EXTENDED RELEASE ORAL at 22:38

## 2017-07-20 RX ADMIN — MORPHINE SULFATE 2 MILLIGRAM(S): 50 CAPSULE, EXTENDED RELEASE ORAL at 22:53

## 2017-07-20 RX ADMIN — AZITHROMYCIN 250 MILLIGRAM(S): 500 TABLET, FILM COATED ORAL at 20:59

## 2017-07-20 RX ADMIN — MORPHINE SULFATE 2 MILLIGRAM(S): 50 CAPSULE, EXTENDED RELEASE ORAL at 17:00

## 2017-07-20 NOTE — PROGRESS NOTE ADULT - SUBJECTIVE AND OBJECTIVE BOX
Patient is a 26y old  Male who presents with a chief complaint of Fever (18 Jul 2017 21:40)        SUBJECTIVE / OVERNIGHT EVENTS:      MEDICATIONS  (STANDING):  sodium chloride 0.9%. 1000 milliLiter(s) (150 mL/Hr) IV Continuous <Continuous>  cefTRIAXone   IVPB 1 Gram(s) IV Intermittent every 24 hours  azithromycin  IVPB 500 milliGRAM(s) IV Intermittent every 24 hours    MEDICATIONS  (PRN):  acetaminophen   Tablet 650 milliGRAM(s) Oral every 6 hours PRN For Temp greater than 38 C (100.4 F)  morphine  - Injectable 2 milliGRAM(s) IV Push every 4 hours PRN Severe Pain (7 - 10)  ALBUTerol/ipratropium for Nebulization 3 milliLiter(s) Nebulizer every 6 hours PRN Shortness of Breath and/or Wheezing  benzonatate 100 milliGRAM(s) Oral every 8 hours PRN Cough  ondansetron Injectable 4 milliGRAM(s) IV Push every 4 hours PRN Nausea and/or Vomiting  acetaminophen   Tablet. 650 milliGRAM(s) Oral every 6 hours PRN pain      Vital Signs Last 24 Hrs  T(C): 37.4 (07-20-17 @ 06:03), Max: 37.4 (07-20-17 @ 06:03)  HR: 60 (07-20-17 @ 06:03) (60 - 81)  BP: 111/60 (07-20-17 @ 06:03) (102/54 - 111/60)  RR: 16 (07-20-17 @ 06:03) (16 - 18)  SpO2: 100% (07-20-17 @ 06:03) (98% - 100%)  CAPILLARY BLOOD GLUCOSE        I&O's Summary    19 Jul 2017 07:01  -  20 Jul 2017 07:00  --------------------------------------------------------  IN: 0 mL / OUT: 550 mL / NET: -550 mL        PHYSICAL EXAM  GENERAL: NAD, well-developed  HEAD:  Atraumatic, Normocephalic  EYES: EOMI, PERRLA, conjunctiva and sclera clear  NECK: Supple, No JVD  CHEST/LUNG:   HEART: Regular rate and rhythm; No murmurs, rubs, or gallops  ABDOMEN: Soft, Nontender, Nondistended; Bowel sounds present  EXTREMITIES:  2+ Peripheral Pulses, No clubbing, cyanosis, or edema  PSYCH: AAOx3  SKIN: No rashes or lesions    LABS:                        14.3   39.13 )-----------( 238      ( 19 Jul 2017 05:58 )             43.2     07-19    138  |  98  |  21  ----------------------------<  60<L>  4.9   |  23  |  1.42<H>    Ca    8.4      19 Jul 2017 05:58  Phos  3.1     07-19  Mg     1.2     07-19    TPro  7.1  /  Alb  3.5  /  TBili  1.8<H>  /  DBili  x   /  AST  39  /  ALT  35  /  AlkPhos  69  07-19    PT/INR - ( 18 Jul 2017 18:31 )   PT: 15.2 SEC;   INR: 1.35          PTT - ( 18 Jul 2017 18:31 )  PTT:36.6 SEC  CARDIAC MARKERS ( 18 Jul 2017 18:31 )  x     / < 0.06 ng/mL / 118 u/L / 1.00 ng/mL / x          Culture - Blood (07.18.17 @ 19:12)    Culture - Blood:   NO ORGANISMS ISOLATED  NO ORGANISMS ISOLATED AT 24 HOURS    Specimen Source: BLOOD VENOUS    Culture - Respiratory with Gram Stain (07.19.17 @ 13:36)    Gram Stain Sputum:   GPCPR^Gram Pos Cocci in Pairs  QUANTITY OF BACTERIA SEEN: FEW (2+)  GVR^GRAM VARIABLE RODS  QUANTITY OF BACTERIA SEEN: FEW (2+)  WBC^White Blood Cells  QNTY CELLS IN GRAM STAIN: MANY (4+)    Specimen Source: SPUTUM        RADIOLOGY & ADDITIONAL TESTS:    Imaging Personally Reviewed:  Consultant(s) Notes Reviewed:    Care Discussed with Consultants/Other Providers: Patient is a 26y old  Male who presents with a chief complaint of Fever (18 Jul 2017 21:40)        SUBJECTIVE / OVERNIGHT EVENTS: Pt reports no events overnight, was afebrile. Reports left side pain has diminished and breathing and cough have improved. Still reports blood tinged sputum. Denies h/a, fever, chills, sweats, dyspnea, chest pain, abd pain, n/v/d.       MEDICATIONS  (STANDING):  sodium chloride 0.9%. 1000 milliLiter(s) (150 mL/Hr) IV Continuous <Continuous>  cefTRIAXone   IVPB 1 Gram(s) IV Intermittent every 24 hours  azithromycin  IVPB 500 milliGRAM(s) IV Intermittent every 24 hours    MEDICATIONS  (PRN):  acetaminophen   Tablet 650 milliGRAM(s) Oral every 6 hours PRN For Temp greater than 38 C (100.4 F)  morphine  - Injectable 2 milliGRAM(s) IV Push every 4 hours PRN Severe Pain (7 - 10)  ALBUTerol/ipratropium for Nebulization 3 milliLiter(s) Nebulizer every 6 hours PRN Shortness of Breath and/or Wheezing  benzonatate 100 milliGRAM(s) Oral every 8 hours PRN Cough  ondansetron Injectable 4 milliGRAM(s) IV Push every 4 hours PRN Nausea and/or Vomiting  acetaminophen   Tablet. 650 milliGRAM(s) Oral every 6 hours PRN pain      Vital Signs Last 24 Hrs  T(C): 37.4 (07-20-17 @ 06:03), Max: 37.4 (07-20-17 @ 06:03)  HR: 60 (07-20-17 @ 06:03) (60 - 81)  BP: 111/60 (07-20-17 @ 06:03) (102/54 - 111/60)  RR: 16 (07-20-17 @ 06:03) (16 - 18)  SpO2: 100% (07-20-17 @ 06:03) (98% - 100%)  CAPILLARY BLOOD GLUCOSE        I&O's Summary    19 Jul 2017 07:01  -  20 Jul 2017 07:00  --------------------------------------------------------  IN: 0 mL / OUT: 550 mL / NET: -550 mL        PHYSICAL EXAM  GENERAL: NAD, well-developed  HEAD:  Atraumatic, Normocephalic  EYES: EOMI, PERRLA, conjunctiva and sclera clear  NECK: Supple, No JVD  CHEST/LUNG:   HEART: Regular rate and rhythm; No murmurs, rubs, or gallops  ABDOMEN: Soft, Nontender, Nondistended; Bowel sounds present  EXTREMITIES:  2+ Peripheral Pulses, No clubbing, cyanosis, or edema  PSYCH: AAOx3  SKIN: No rashes or lesions    LABS:                              12.7   26.51 )-----------( 227      ( 20 Jul 2017 06:15 )             37.6     07-20    138  |  101  |  14  ----------------------------<  85  4.3   |  23  |  1.20    Ca    9.2      20 Jul 2017 06:15  Phos  2.6     07-20  Mg     1.7     07-20    TPro  7.1  /  Alb  3.5  /  TBili  1.8<H>  /  DBili  x   /  AST  39  /  ALT  35  /  AlkPhos  69  07-19    PT/INR - ( 18 Jul 2017 18:31 )   PT: 15.2 SEC;   INR: 1.35          PTT - ( 18 Jul 2017 18:31 )  PTT:36.6 SEC  CARDIAC MARKERS ( 18 Jul 2017 18:31 )  x     / < 0.06 ng/mL / 118 u/L / 1.00 ng/mL / x             PTT - ( 18 Jul 2017 18:31 )  PTT:36.6 SEC  CARDIAC MARKERS ( 18 Jul 2017 18:31 )  x     / < 0.06 ng/mL / 118 u/L / 1.00 ng/mL / x          Culture - Blood (07.18.17 @ 19:12)    Culture - Blood:   NO ORGANISMS ISOLATED  NO ORGANISMS ISOLATED AT 24 HOURS    Specimen Source: BLOOD VENOUS    Culture - Respiratory with Gram Stain (07.19.17 @ 13:36)    Gram Stain Sputum:   GPCPR^Gram Pos Cocci in Pairs  QUANTITY OF BACTERIA SEEN: FEW (2+)  GVR^GRAM VARIABLE RODS  QUANTITY OF BACTERIA SEEN: FEW (2+)  WBC^White Blood Cells  QNTY CELLS IN GRAM STAIN: MANY (4+)    Specimen Source: SPUTUM        RADIOLOGY & ADDITIONAL TESTS:    Imaging Personally Reviewed:  Consultant(s) Notes Reviewed:    Care Discussed with Consultants/Other Providers: Patient is a 26y old  Male who presents with a chief complaint of Fever (2017 21:40)        SUBJECTIVE / OVERNIGHT EVENTS: Pt reports no events overnight, was afebrile. Reports left side pain has diminished and breathing and cough have improved. Still reports blood tinged sputum. Denies h/a, fever, chills, sweats, dyspnea, chest pain, abd pain, n/v/d.       MEDICATIONS  (STANDING):  sodium chloride 0.9%. 1000 milliLiter(s) (150 mL/Hr) IV Continuous <Continuous>  cefTRIAXone   IVPB 1 Gram(s) IV Intermittent every 24 hours  azithromycin  IVPB 500 milliGRAM(s) IV Intermittent every 24 hours    MEDICATIONS  (PRN):  acetaminophen   Tablet 650 milliGRAM(s) Oral every 6 hours PRN For Temp greater than 38 C (100.4 F)  morphine  - Injectable 2 milliGRAM(s) IV Push every 4 hours PRN Severe Pain (7 - 10)  ALBUTerol/ipratropium for Nebulization 3 milliLiter(s) Nebulizer every 6 hours PRN Shortness of Breath and/or Wheezing  benzonatate 100 milliGRAM(s) Oral every 8 hours PRN Cough  ondansetron Injectable 4 milliGRAM(s) IV Push every 4 hours PRN Nausea and/or Vomiting  acetaminophen   Tablet. 650 milliGRAM(s) Oral every 6 hours PRN pain      Vital Signs Last 24 Hrs  T(C): 37.4 (17 @ 06:03), Max: 37.4 (17 @ 06:03)  HR: 60 (17 @ 06:03) (60 - 81)  BP: 111/60 (17 @ 06:03) (102/54 - 111/60)  RR: 16 (17 @ 06:03) (16 - 18)  SpO2: 100% (17 @ 06:03) (98% - 100%)  CAPILLARY BLOOD GLUCOSE        I&O's Summary    2017 07:01  -  2017 07:00  --------------------------------------------------------  IN: 0 mL / OUT: 550 mL / NET: -550 mL        PHYSICAL EXAM  GENERAL: NAD, well-developed  HEAD:  Atraumatic, Normocephalic  EYES: EOMI, PERRLA, conjunctiva and sclera clear  NECK: Supple, No JVD  CHEST/LUNG:   HEART: Regular rate and rhythm; No murmurs, rubs, or gallops  ABDOMEN: Soft, Nontender, Nondistended; Bowel sounds present  EXTREMITIES:  2+ Peripheral Pulses, No clubbing, cyanosis, or edema  PSYCH: AAOx3  SKIN: No rashes or lesions    LABS:                              12.7   26.51 )-----------( 227      ( 2017 06:15 )             37.6     07-20    138  |  101  |  14  ----------------------------<  85  4.3   |  23  |  1.20    Ca    9.2      2017 06:15  Phos  2.6     07-20  Mg     1.7     07-20    TPro  7.1  /  Alb  3.5  /  TBili  1.8<H>  /  DBili  x   /  AST  39  /  ALT  35  /  AlkPhos  69  07-19    PT/INR - ( 2017 18:31 )   PT: 15.2 SEC;   INR: 1.35          PTT - ( 2017 18:31 )  PTT:36.6 SEC  CARDIAC MARKERS ( 2017 18:31 )  x     / < 0.06 ng/mL / 118 u/L / 1.00 ng/mL / x             PTT - ( 2017 18:31 )  PTT:36.6 SEC  CARDIAC MARKERS ( 2017 18:31 )  x     / < 0.06 ng/mL / 118 u/L / 1.00 ng/mL / x          Culture - Blood (17 @ 19:12)    Culture - Blood:   NO ORGANISMS ISOLATED  NO ORGANISMS ISOLATED AT 24 HOURS    Specimen Source: BLOOD VENOUS    Culture - Respiratory with Gram Stain (17 @ 13:36)    Gram Stain Sputum:   GPCPR^Gram Pos Cocci in Pairs  QUANTITY OF BACTERIA SEEN: FEW (2+)  GVR^GRAM VARIABLE RODS  QUANTITY OF BACTERIA SEEN: FEW (2+)  WBC^White Blood Cells  QNTY CELLS IN GRAM STAIN: MANY (4+)    Specimen Source: SPUTUM    Immunoglobulin, Serum (17 @ 06:15)    Quantitative IgA: 166 mg/dL    Quantitative Ig mg/dL    Quantitative IgM: 67 mg/dL    C4 Complement, Serum (17 @ 06:15)    C4 Complement, Serum: 34.0 mg/dL    C3 Complement, Serum (17 @ 06:15)    C3 Complement, Serum: 32.1 mg/dL          RADIOLOGY & ADDITIONAL TESTS:    Imaging Personally Reviewed:  Consultant(s) Notes Reviewed:    Care Discussed with Consultants/Other Providers: Patient is a 26y old  Male who presents with a chief complaint of Fever (2017 21:40)        SUBJECTIVE / OVERNIGHT EVENTS: Pt reports no events overnight, was afebrile. Reports left side pain has diminished and breathing and cough have improved. Still reports blood tinged sputum. Denies h/a, fever, chills, sweats, dyspnea, chest pain, abd pain, n/v/d.       MEDICATIONS  (STANDING):  sodium chloride 0.9%. 1000 milliLiter(s) (150 mL/Hr) IV Continuous <Continuous>  cefTRIAXone   IVPB 1 Gram(s) IV Intermittent every 24 hours  azithromycin  IVPB 500 milliGRAM(s) IV Intermittent every 24 hours    MEDICATIONS  (PRN):  acetaminophen   Tablet 650 milliGRAM(s) Oral every 6 hours PRN For Temp greater than 38 C (100.4 F)  morphine  - Injectable 2 milliGRAM(s) IV Push every 4 hours PRN Severe Pain (7 - 10)  ALBUTerol/ipratropium for Nebulization 3 milliLiter(s) Nebulizer every 6 hours PRN Shortness of Breath and/or Wheezing  benzonatate 100 milliGRAM(s) Oral every 8 hours PRN Cough  ondansetron Injectable 4 milliGRAM(s) IV Push every 4 hours PRN Nausea and/or Vomiting  acetaminophen   Tablet. 650 milliGRAM(s) Oral every 6 hours PRN pain      Vital Signs Last 24 Hrs  T(C): 37.4 (17 @ 06:03), Max: 37.4 (17 @ 06:03)  HR: 60 (17 @ 06:03) (60 - 81)  BP: 111/60 (17 @ 06:03) (102/54 - 111/60)  RR: 16 (17 @ 06:03) (16 - 18)  SpO2: 100% (17 @ 06:03) (98% - 100%)  CAPILLARY BLOOD GLUCOSE        I&O's Summary    2017 07:01  -  2017 07:00  --------------------------------------------------------  IN: 0 mL / OUT: 550 mL / NET: -550 mL        PHYSICAL EXAM  GENERAL: NAD, well-developed  HEAD:  Atraumatic, Normocephalic  EYES: EOMI, PERRLA, conjunctiva and sclera clear  NECK: Supple, No JVD  CHEST/LUNG: CTAB, no wheezing  HEART: Regular rate and rhythm; No murmurs, rubs, or gallops  ABDOMEN: Soft, Nontender, Nondistended; Bowel sounds present  EXTREMITIES:  2+ Peripheral Pulses, No clubbing, cyanosis, or edema  PSYCH: AAOx3  SKIN: No rashes or lesions    LABS:                              12.7   26.51 )-----------( 227      ( 2017 06:15 )             37.6     07-20    138  |  101  |  14  ----------------------------<  85  4.3   |  23  |  1.20    Ca    9.2      2017 06:15  Phos  2.6     07-20  Mg     1.7     07-20    TPro  7.1  /  Alb  3.5  /  TBili  1.8<H>  /  DBili  x   /  AST  39  /  ALT  35  /  AlkPhos  69  07-19    PT/INR - ( 2017 18:31 )   PT: 15.2 SEC;   INR: 1.35          PTT - ( 2017 18:31 )  PTT:36.6 SEC  CARDIAC MARKERS ( 2017 18:31 )  x     / < 0.06 ng/mL / 118 u/L / 1.00 ng/mL / x             PTT - ( 2017 18:31 )  PTT:36.6 SEC  CARDIAC MARKERS ( 2017 18:31 )  x     / < 0.06 ng/mL / 118 u/L / 1.00 ng/mL / x          Culture - Blood (17 @ 19:12)    Culture - Blood:   NO ORGANISMS ISOLATED  NO ORGANISMS ISOLATED AT 24 HOURS    Specimen Source: BLOOD VENOUS    Culture - Respiratory with Gram Stain (17 @ 13:36)    Gram Stain Sputum:   GPCPR^Gram Pos Cocci in Pairs  QUANTITY OF BACTERIA SEEN: FEW (2+)  GVR^GRAM VARIABLE RODS  QUANTITY OF BACTERIA SEEN: FEW (2+)  WBC^White Blood Cells  QNTY CELLS IN GRAM STAIN: MANY (4+)    Specimen Source: SPUTUM    Immunoglobulin, Serum (17 @ 06:15)    Quantitative IgA: 166 mg/dL    Quantitative Ig mg/dL    Quantitative IgM: 67 mg/dL    C4 Complement, Serum (17 @ 06:15)    C4 Complement, Serum: 34.0 mg/dL    C3 Complement, Serum (17 @ 06:15)    C3 Complement, Serum: 32.1 mg/dL          RADIOLOGY & ADDITIONAL TESTS:    Imaging Personally Reviewed:  Consultant(s) Notes Reviewed:    Care Discussed with Consultants/Other Providers: Patient is a 26y old  Male who presents with a chief complaint of Fever (2017 21:40)        SUBJECTIVE / OVERNIGHT EVENTS: Pt reports no events overnight, was afebrile. Reports left side pain has diminished and breathing and cough have improved. Still reports blood tinged sputum. Denies h/a, fever, chills, sweats, dyspnea, chest pain, abd pain, n/v/d.       MEDICATIONS  (STANDING):  sodium chloride 0.9%. 1000 milliLiter(s) (150 mL/Hr) IV Continuous <Continuous>  cefTRIAXone   IVPB 1 Gram(s) IV Intermittent every 24 hours  azithromycin  IVPB 500 milliGRAM(s) IV Intermittent every 24 hours    MEDICATIONS  (PRN):  acetaminophen   Tablet 650 milliGRAM(s) Oral every 6 hours PRN For Temp greater than 38 C (100.4 F)  morphine  - Injectable 2 milliGRAM(s) IV Push every 4 hours PRN Severe Pain (7 - 10)  ALBUTerol/ipratropium for Nebulization 3 milliLiter(s) Nebulizer every 6 hours PRN Shortness of Breath and/or Wheezing  benzonatate 100 milliGRAM(s) Oral every 8 hours PRN Cough  ondansetron Injectable 4 milliGRAM(s) IV Push every 4 hours PRN Nausea and/or Vomiting  acetaminophen   Tablet. 650 milliGRAM(s) Oral every 6 hours PRN pain      Vital Signs Last 24 Hrs  T(C): 37.4 (17 @ 06:03), Max: 37.4 (17 @ 06:03)  HR: 60 (17 @ 06:03) (60 - 81)  BP: 111/60 (17 @ 06:03) (102/54 - 111/60)  RR: 16 (17 @ 06:03) (16 - 18)  SpO2: 100% (17 @ 06:03) (98% - 100%)  CAPILLARY BLOOD GLUCOSE        I&O's Summary    2017 07:01  -  2017 07:00  --------------------------------------------------------  IN: 0 mL / OUT: 550 mL / NET: -550 mL        PHYSICAL EXAM  GENERAL: NAD, well-developed  HEAD:  Atraumatic, Normocephalic  EYES: EOMI, PERRLA, conjunctiva and sclera clear  NECK: Supple, No JVD  CHEST/LUNG: CTAB, no wheezing  HEART: Regular rate and rhythm; No murmurs, rubs, or gallops  ABDOMEN: Soft, Nontender, Nondistended; Bowel sounds present  EXTREMITIES:  2+ Peripheral Pulses, No clubbing, cyanosis, or edema  PSYCH: AAOx3  SKIN: No rashes or lesions    LABS:                              12.7   26.51 )-----------( 227      ( 2017 06:15 )             37.6     07-20    138  |  101  |  14  ----------------------------<  85  4.3   |  23  |  1.20    Ca    9.2      2017 06:15  Phos  2.6     07-20  Mg     1.7     07-20    TPro  7.1  /  Alb  3.5  /  TBili  1.8<H>  /  DBili  x   /  AST  39  /  ALT  35  /  AlkPhos  69  07-19    PT/INR - ( 2017 18:31 )   PT: 15.2 SEC;   INR: 1.35          PTT - ( 2017 18:31 )  PTT:36.6 SEC  CARDIAC MARKERS ( 2017 18:31 )  x     / < 0.06 ng/mL / 118 u/L / 1.00 ng/mL / x             PTT - ( 2017 18:31 )  PTT:36.6 SEC  CARDIAC MARKERS ( 2017 18:31 )  x     / < 0.06 ng/mL / 118 u/L / 1.00 ng/mL / x          Culture - Blood (17 @ 19:12)    Culture - Blood:   NO ORGANISMS ISOLATED  NO ORGANISMS ISOLATED AT 24 HOURS    Specimen Source: BLOOD VENOUS    Culture - Respiratory with Gram Stain (17 @ 13:36)    Gram Stain Sputum:   GPCPR^Gram Pos Cocci in Pairs  QUANTITY OF BACTERIA SEEN: FEW (2+)  GVR^GRAM VARIABLE RODS  QUANTITY OF BACTERIA SEEN: FEW (2+)  WBC^White Blood Cells  QNTY CELLS IN GRAM STAIN: MANY (4+)    Specimen Source: SPUTUM    Immunoglobulin, Serum (17 @ 06:15)    Quantitative IgA: 166 mg/dL    Quantitative Ig mg/dL    Quantitative IgM: 67 mg/dL    C4 Complement, Serum (17 @ 06:15)    C4 Complement, Serum: 34.0 mg/dL    C3 Complement, Serum (17 @ 06:15)    C3 Complement, Serum: 32.1 mg/dL          RADIOLOGY & ADDITIONAL TESTS:    Imaging Personally Reviewed:  Consultant(s) Notes Reviewed:    Care Discussed with Consultants/Other Providers: Allergy/Immunology for consultation

## 2017-07-20 NOTE — PROGRESS NOTE ADULT - ASSESSMENT
27 yo M with h/o complement deficiency, asthma, prior PNA c/b chest tube placement 10 yrs ago p/w fever, cough and SOB 2/2 severe sepsis 2/2 CAP. 25 yo M with h/o complement deficiency, asthma, prior PNA c/b chest tube placement 10 yrs ago p/w fever, cough and SOB 2/2 severe sepsis 2/2 CAP. Abx Day 3

## 2017-07-20 NOTE — PROGRESS NOTE ADULT - PROBLEM SELECTOR PLAN 2
- Sepsis 2/2 PNA in setting of complement deficiency   - Continue ceftriaxone and azithromycin for CAP  - blood culture NGTD  --sputum gram stain with G+ cocci pairs and G- rods  -RVP neg - Sepsis 2/2 PNA in setting of complement deficiency   - Continue ceftriaxone and azithromycin for CAP; day 3  - blood culture NGTD  --sputum gram stain with G+ cocci pairs and G- rods  -RVP neg  -still reports blood tinged sputum, consider chest CT and pulm consult - Sepsis 2/2 PNA in setting of complement deficiency   - Continue ceftriaxone and azithromycin for CAP; day 3  - blood culture NGTD  --sputum gram stain with G+ cocci pairs and G- rods  -RVP neg

## 2017-07-20 NOTE — PROGRESS NOTE ADULT - PROBLEM SELECTOR PLAN 1
- Sepsis 2/2 PNA in setting of complement deficiency   - Continue ceftriaxone and azithromycin for CAP  - f/u blood culture  -order sputum culture  -RVP neg  - supportive care- nebs, cough suppressant, Tylenol, fluids. morphine for chest pain - Sepsis 2/2 CAP PNA in setting of complement deficiency   - bl cx- NGTD  - sputum gram stain: GPCPR - Gram Pos Cocci in Pairs , gram variable rods  - supportive care- nebs, cough suppressant, Tylenol, fluids. morphine for chest pain  -Elena neg - Sepsis 2/2 CAP PNA in setting of complement deficiency   - bl cx- NGTD  - sputum gram stain: GPCPR - Gram Pos Cocci in Pairs , gram variable rods  - supportive care- nebs, cough suppressant, Tylenol, fluids. morphine for chest pain  -Elena neg  -consider chest CT and then pulm consult  -consider ID consult - Sepsis 2/2 CAP PNA in setting of complement deficiency   - bl cx- NGTD  - sputum gram stain: GPCPR - Gram Pos Cocci in Pairs , gram variable rods  - supportive care- nebs, cough suppressant, Tylenol, fluids. morphine for chest pain  -Elena neg  -consider ID consult

## 2017-07-20 NOTE — PROGRESS NOTE ADULT - PROBLEM SELECTOR PLAN 3
-f/u with pediatrician to discuss type of deficiency and treatment  -ordered C3 complement, c4 comp, diph ab, H inf ab, strep Pneum ab, tetanus ab,  total hemolytic comp, AH50,  and mannose binding lectin -f/u with pediatrician to discuss type of deficiency and treatment  -ordered C3 complement, c4 comp, diph ab, H inf ab, strep Pneum ab, tetanus ab,  total hemolytic comp, AH50,  and mannose binding lectin  -C3 complement low at 32  -Quantitative IgG, IgA, IgM nml -f/u with pediatrician to discuss type of deficiency and treatment  -ordered C3 complement, c4 comp, diph ab, H inf ab, strep Pneum ab, tetanus ab,  total hemolytic comp, AH50,  and mannose binding lectin  -C3 complement low at 32  -Quantitative IgG, IgA, IgM nml  -A/I evaluation as patient as not followed up with a physician in 10 years and will need f/u

## 2017-07-20 NOTE — PROGRESS NOTE ADULT - PROBLEM SELECTOR PROBLEM 1
Pneumonia of left lung due to infectious organism, unspecified part of lung Sepsis, due to unspecified organism

## 2017-07-21 ENCOUNTER — TRANSCRIPTION ENCOUNTER (OUTPATIENT)
Age: 27
End: 2017-07-21

## 2017-07-21 LAB
BACTERIA SPT RESP CULT: SIGNIFICANT CHANGE UP
BASOPHILS # BLD AUTO: 0.05 K/UL — SIGNIFICANT CHANGE UP (ref 0–0.2)
BASOPHILS NFR BLD AUTO: 0.3 % — SIGNIFICANT CHANGE UP (ref 0–2)
BASOPHILS NFR SPEC: 0 % — SIGNIFICANT CHANGE UP (ref 0–2)
BASOPHILS NFR SPEC: 0 % — SIGNIFICANT CHANGE UP (ref 0–2)
BUN SERPL-MCNC: 12 MG/DL — SIGNIFICANT CHANGE UP (ref 7–23)
BUN SERPL-MCNC: 12 MG/DL — SIGNIFICANT CHANGE UP (ref 7–23)
CALCIUM SERPL-MCNC: 9.1 MG/DL — SIGNIFICANT CHANGE UP (ref 8.4–10.5)
CALCIUM SERPL-MCNC: 9.1 MG/DL — SIGNIFICANT CHANGE UP (ref 8.4–10.5)
CHLORIDE SERPL-SCNC: 103 MMOL/L — SIGNIFICANT CHANGE UP (ref 98–107)
CHLORIDE SERPL-SCNC: 103 MMOL/L — SIGNIFICANT CHANGE UP (ref 98–107)
CO2 SERPL-SCNC: 24 MMOL/L — SIGNIFICANT CHANGE UP (ref 22–31)
CO2 SERPL-SCNC: 24 MMOL/L — SIGNIFICANT CHANGE UP (ref 22–31)
CREAT SERPL-MCNC: 1.07 MG/DL — SIGNIFICANT CHANGE UP (ref 0.5–1.3)
CREAT SERPL-MCNC: 1.07 MG/DL — SIGNIFICANT CHANGE UP (ref 0.5–1.3)
EOSINOPHIL # BLD AUTO: 0.15 K/UL — SIGNIFICANT CHANGE UP (ref 0–0.5)
EOSINOPHIL NFR BLD AUTO: 0.8 % — SIGNIFICANT CHANGE UP (ref 0–6)
EOSINOPHIL NFR FLD: 2 % — SIGNIFICANT CHANGE UP (ref 0–6)
EOSINOPHIL NFR FLD: 2 % — SIGNIFICANT CHANGE UP (ref 0–6)
GLUCOSE SERPL-MCNC: 75 MG/DL — SIGNIFICANT CHANGE UP (ref 70–99)
GLUCOSE SERPL-MCNC: 75 MG/DL — SIGNIFICANT CHANGE UP (ref 70–99)
HCT VFR BLD CALC: 34.5 % — LOW (ref 39–50)
HCT VFR BLD CALC: 34.5 % — LOW (ref 39–50)
HGB BLD-MCNC: 11.7 G/DL — LOW (ref 13–17)
HGB BLD-MCNC: 11.7 G/DL — LOW (ref 13–17)
IMM GRANULOCYTES # BLD AUTO: 0.13 # — SIGNIFICANT CHANGE UP
IMM GRANULOCYTES NFR BLD AUTO: 0.7 % — SIGNIFICANT CHANGE UP (ref 0–1.5)
LYMPHOCYTES # BLD AUTO: 13.9 % — SIGNIFICANT CHANGE UP (ref 13–44)
LYMPHOCYTES # BLD AUTO: 2.59 K/UL — SIGNIFICANT CHANGE UP (ref 1–3.3)
LYMPHOCYTES NFR SPEC AUTO: 6 % — LOW (ref 13–44)
LYMPHOCYTES NFR SPEC AUTO: 6 % — LOW (ref 13–44)
MAGNESIUM SERPL-MCNC: 1.7 MG/DL — SIGNIFICANT CHANGE UP (ref 1.6–2.6)
MANUAL SMEAR VERIFICATION: SIGNIFICANT CHANGE UP
MANUAL SMEAR VERIFICATION: SIGNIFICANT CHANGE UP
MCHC RBC-ENTMCNC: 29.4 PG — SIGNIFICANT CHANGE UP (ref 27–34)
MCHC RBC-ENTMCNC: 29.4 PG — SIGNIFICANT CHANGE UP (ref 27–34)
MCHC RBC-ENTMCNC: 33.9 % — SIGNIFICANT CHANGE UP (ref 32–36)
MCHC RBC-ENTMCNC: 33.9 % — SIGNIFICANT CHANGE UP (ref 32–36)
MCV RBC AUTO: 86.7 FL — SIGNIFICANT CHANGE UP (ref 80–100)
MCV RBC AUTO: 86.7 FL — SIGNIFICANT CHANGE UP (ref 80–100)
MONOCYTES # BLD AUTO: 1.22 K/UL — HIGH (ref 0–0.9)
MONOCYTES NFR BLD AUTO: 6.5 % — SIGNIFICANT CHANGE UP (ref 2–14)
MONOCYTES NFR BLD: 3 % — SIGNIFICANT CHANGE UP (ref 2–9)
MONOCYTES NFR BLD: 3 % — SIGNIFICANT CHANGE UP (ref 2–9)
MORPHOLOGY BLD-IMP: NORMAL — SIGNIFICANT CHANGE UP
MORPHOLOGY BLD-IMP: NORMAL — SIGNIFICANT CHANGE UP
NEUTROPHIL AB SER-ACNC: 82 % — HIGH (ref 43–77)
NEUTROPHIL AB SER-ACNC: 82 % — HIGH (ref 43–77)
NEUTROPHILS # BLD AUTO: 14.53 K/UL — HIGH (ref 1.8–7.4)
NEUTROPHILS NFR BLD AUTO: 77.8 % — HIGH (ref 43–77)
NEUTS BAND # BLD: 7 % — HIGH (ref 0–6)
NEUTS BAND # BLD: 7 % — HIGH (ref 0–6)
NRBC # FLD: 0 — SIGNIFICANT CHANGE UP
NRBC # FLD: 0 — SIGNIFICANT CHANGE UP
PHOSPHATE SERPL-MCNC: 3 MG/DL — SIGNIFICANT CHANGE UP (ref 2.5–4.5)
PLATELET # BLD AUTO: 258 K/UL — SIGNIFICANT CHANGE UP (ref 150–400)
PLATELET # BLD AUTO: 258 K/UL — SIGNIFICANT CHANGE UP (ref 150–400)
PLATELET COUNT - ESTIMATE: NORMAL — SIGNIFICANT CHANGE UP
PLATELET COUNT - ESTIMATE: NORMAL — SIGNIFICANT CHANGE UP
PMV BLD: 9.9 FL — SIGNIFICANT CHANGE UP (ref 7–13)
PMV BLD: 9.9 FL — SIGNIFICANT CHANGE UP (ref 7–13)
POTASSIUM SERPL-MCNC: 4 MMOL/L — SIGNIFICANT CHANGE UP (ref 3.5–5.3)
POTASSIUM SERPL-MCNC: 4 MMOL/L — SIGNIFICANT CHANGE UP (ref 3.5–5.3)
POTASSIUM SERPL-SCNC: 4 MMOL/L — SIGNIFICANT CHANGE UP (ref 3.5–5.3)
POTASSIUM SERPL-SCNC: 4 MMOL/L — SIGNIFICANT CHANGE UP (ref 3.5–5.3)
RBC # BLD: 3.98 M/UL — LOW (ref 4.2–5.8)
RBC # BLD: 3.98 M/UL — LOW (ref 4.2–5.8)
RBC # FLD: 11.8 % — SIGNIFICANT CHANGE UP (ref 10.3–14.5)
RBC # FLD: 11.8 % — SIGNIFICANT CHANGE UP (ref 10.3–14.5)
SODIUM SERPL-SCNC: 141 MMOL/L — SIGNIFICANT CHANGE UP (ref 135–145)
SODIUM SERPL-SCNC: 141 MMOL/L — SIGNIFICANT CHANGE UP (ref 135–145)
TOTAL HEM COMP BLD-ACNC: 65 U/ML — HIGH (ref 42–60)
WBC # BLD: 18.67 K/UL — HIGH (ref 3.8–10.5)
WBC # BLD: 18.67 K/UL — HIGH (ref 3.8–10.5)
WBC # FLD AUTO: 18.67 K/UL — HIGH (ref 3.8–10.5)
WBC # FLD AUTO: 18.67 K/UL — HIGH (ref 3.8–10.5)

## 2017-07-21 PROCEDURE — 99233 SBSQ HOSP IP/OBS HIGH 50: CPT | Mod: GC

## 2017-07-21 RX ADMIN — Medication 650 MILLIGRAM(S): at 19:14

## 2017-07-21 RX ADMIN — Medication 650 MILLIGRAM(S): at 20:10

## 2017-07-21 NOTE — DISCHARGE NOTE ADULT - PLAN OF CARE
Resolution You presented with fever, cough, high White blood cell count, and chest pain. You were found to have Community-acquired Pneumonia in the left lung. You were treated with IV antibiotics - Ceftriaxone and Azithromycin for 4 days. You have improved and are being discharged with Levaquin. Please take 750mg by mouth for 5 days. Please follow up with your primary care doctor within the next two weeks for evaluation and to ensure resolution of your pneumonia. You presented with fever, cough, high WBCs and chest pain. You were found to have Pneumonia and treated with brochodilator therapy and IV antibiotics. Please continue to take your antibiotic - Levaquin 750mg daily by mouth for 5 days. Please follow up with your primary care physician within the next two weeks for further evaluation and to ensure resolution of your symptoms. management You have a complement deficiency. This means your immune system can be vulnerable puts you at higher for infections. Please go to your primary care doctor or go to ER when you have symptoms of infections such fever, cough, nigh sweats. You also require the pneumovax vaccine after your pneumonia resolves. Please follow up with your primary care provider within the next two weeks for further management. Also follow up with an Allergy and Immunology physician within the next two weeks to best manage your condition.Please contact [facility] Allergy Asthma and Immunology [phone] at [address] [place] [address] to schedule an appointment. You presented with fever, cough, high White blood cell count, and chest pain. You were found to have Community-acquired Pneumonia in the left lung. You were treated with IV antibiotics - Ceftriaxone and Azithromycin for 4 days. You have improved and are being discharged with Levaquin. Please take 750mg by mouth for 5 days. Please follow up with your primary care doctor at the [facility],  within the next two weeks for evaluation and to ensure resolution of your pneumonia. You presented with fever, cough, high White blood cell count, and chest pain. You were found to have Community-acquired Pneumonia in the left lung. You were treated with IV antibiotics - Ceftriaxone and Azithromycin for 4 days. You have improved and are being discharged with Levaquin. Please take 750mg by mouth for 5 days (your last dose will be on 7/28/17) . Please follow up with your primary care doctor at the [facility],  within the next two weeks for evaluation and to ensure resolution of your pneumonia.

## 2017-07-21 NOTE — DISCHARGE NOTE ADULT - ADDITIONAL INSTRUCTIONS
Please follow up with your primary care physician and your allergy and immunology physician within the next two weeks for evaluation and pneumovax vaccine. Please follow up with your primary care physician at [facility] within the next two weeks for evaluation and pneumovax vaccine. Please contact the [facility] allergy and immunology at [phone] within the next two weeks for further evaluation and management. Please follow up with your primary care physician at [facility] within the next two weeks for evaluation and pneumovax vaccine.   Please contact the [facility] allergy and immunology at [phone] within the next two weeks for further evaluation and management.

## 2017-07-21 NOTE — PROGRESS NOTE ADULT - SUBJECTIVE AND OBJECTIVE BOX
Patient is a 26y old  Male who presents with a chief complaint of Fever (18 Jul 2017 21:40)        SUBJECTIVE / OVERNIGHT EVENTS: Pt reports doing well with no overnight events. Has been afebrile, denies chest pain, fever, h/a, dyspnea, abd pain, leg pain. Sister was in ED yesterday and saw patient.      MEDICATIONS  (STANDING):  sodium chloride 0.9%. 1000 milliLiter(s) (150 mL/Hr) IV Continuous <Continuous>  cefTRIAXone   IVPB 1 Gram(s) IV Intermittent every 24 hours  azithromycin  IVPB 500 milliGRAM(s) IV Intermittent every 24 hours    MEDICATIONS  (PRN):  acetaminophen   Tablet 650 milliGRAM(s) Oral every 6 hours PRN For Temp greater than 38 C (100.4 F)  morphine  - Injectable 2 milliGRAM(s) IV Push every 4 hours PRN Severe Pain (7 - 10)  ALBUTerol/ipratropium for Nebulization 3 milliLiter(s) Nebulizer every 6 hours PRN Shortness of Breath and/or Wheezing  benzonatate 100 milliGRAM(s) Oral every 8 hours PRN Cough  ondansetron Injectable 4 milliGRAM(s) IV Push every 4 hours PRN Nausea and/or Vomiting  acetaminophen   Tablet. 650 milliGRAM(s) Oral every 6 hours PRN pain          I&O's Summary      PHYSICAL EXAM  GENERAL: NAD, well-developed  HEAD:  Atraumatic, Normocephalic  EYES: EOMI, PERRLA, conjunctiva and sclera clear  NECK: Supple, No JVD  CHEST/LUNG: Clear to auscultation bilaterally; No wheeze  HEART: Regular rate and rhythm; No murmurs, rubs, or gallops  ABDOMEN: Soft, Nontender, Nondistended; Bowel sounds present  EXTREMITIES:  2+ Peripheral Pulses, No clubbing, cyanosis, or edema  PSYCH: AAOx3  SKIN: No rashes or lesions    LABS:                        11.7   18.67 )-----------( 258      ( 21 Jul 2017 05:00 )             34.5     07-21    141  |  103  |  12  ----------------------------<  75  4.0   |  24  |  1.07    Ca    9.1      21 Jul 2017 05:00  Phos  3.0     07-21  Mg     1.7     07-21                RADIOLOGY & ADDITIONAL TESTS:    Imaging Personally Reviewed:  Consultant(s) Notes Reviewed:    Care Discussed with Consultants/Other Providers: Patient is a 26y old  Male who presents with a chief complaint of Fever (18 Jul 2017 21:40)        SUBJECTIVE / OVERNIGHT EVENTS: Pt reports h/a after BD Has been afebrile, denies chest pain, fever, h/a, dyspnea, abd pain, leg pain. Sister was in ED yesterday and saw patient.      MEDICATIONS  (STANDING):  sodium chloride 0.9%. 1000 milliLiter(s) (150 mL/Hr) IV Continuous <Continuous>  cefTRIAXone   IVPB 1 Gram(s) IV Intermittent every 24 hours  azithromycin  IVPB 500 milliGRAM(s) IV Intermittent every 24 hours    MEDICATIONS  (PRN):  acetaminophen   Tablet 650 milliGRAM(s) Oral every 6 hours PRN For Temp greater than 38 C (100.4 F)  morphine  - Injectable 2 milliGRAM(s) IV Push every 4 hours PRN Severe Pain (7 - 10)  ALBUTerol/ipratropium for Nebulization 3 milliLiter(s) Nebulizer every 6 hours PRN Shortness of Breath and/or Wheezing  benzonatate 100 milliGRAM(s) Oral every 8 hours PRN Cough  ondansetron Injectable 4 milliGRAM(s) IV Push every 4 hours PRN Nausea and/or Vomiting  acetaminophen   Tablet. 650 milliGRAM(s) Oral every 6 hours PRN pain          I&O's Summary      PHYSICAL EXAM  GENERAL: NAD, well-developed  HEAD:  Atraumatic, Normocephalic  EYES: EOMI, PERRLA, conjunctiva and sclera clear  NECK: Supple, No JVD  CHEST/LUNG: left side with crackles at base. No wheezing  HEART: Regular rate and rhythm; No murmurs, rubs, or gallops  ABDOMEN: Soft, Nontender, Nondistended; Bowel sounds present  EXTREMITIES:  2+ Peripheral Pulses, No clubbing, cyanosis, or edema  PSYCH: AAOx3  SKIN: No rashes or lesions    LABS:                        11.7   18.67 )-----------( 258      ( 21 Jul 2017 05:00 )             34.5     07-21    141  |  103  |  12  ----------------------------<  75  4.0   |  24  |  1.07    Ca    9.1      21 Jul 2017 05:00  Phos  3.0     07-21  Mg     1.7     07-21                RADIOLOGY & ADDITIONAL TESTS:    Imaging Personally Reviewed:  Consultant(s) Notes Reviewed:    Care Discussed with Consultants/Other Providers: Patient is a 26y old  Male who presents with a chief complaint of Fever (18 Jul 2017 21:40)        SUBJECTIVE / OVERNIGHT EVENTS: Pt reports h/a after BD. Also reports continued production of blood tinged sputum. Reports breathing similar to yesterday with no change. Has been afebrile, denies chest pain, fever, h/a, dyspnea, abd pain, leg pain. Mother at bedside brought records, placed in chart.      MEDICATIONS  (STANDING):  sodium chloride 0.9%. 1000 milliLiter(s) (150 mL/Hr) IV Continuous <Continuous>  cefTRIAXone   IVPB 1 Gram(s) IV Intermittent every 24 hours  azithromycin  IVPB 500 milliGRAM(s) IV Intermittent every 24 hours    MEDICATIONS  (PRN):  acetaminophen   Tablet 650 milliGRAM(s) Oral every 6 hours PRN For Temp greater than 38 C (100.4 F)  morphine  - Injectable 2 milliGRAM(s) IV Push every 4 hours PRN Severe Pain (7 - 10)  ALBUTerol/ipratropium for Nebulization 3 milliLiter(s) Nebulizer every 6 hours PRN Shortness of Breath and/or Wheezing  benzonatate 100 milliGRAM(s) Oral every 8 hours PRN Cough  ondansetron Injectable 4 milliGRAM(s) IV Push every 4 hours PRN Nausea and/or Vomiting  acetaminophen   Tablet. 650 milliGRAM(s) Oral every 6 hours PRN pain          I&O's Summary      PHYSICAL EXAM  GENERAL: NAD, well-developed  HEAD:  Atraumatic, Normocephalic  EYES: EOMI, PERRLA, conjunctiva and sclera clear  NECK: Supple, No JVD  CHEST/LUNG: left side with crackles at base. No wheezing  HEART: Regular rate and rhythm; No murmurs, rubs, or gallops  ABDOMEN: Soft, Nontender, Nondistended; Bowel sounds present  EXTREMITIES:  2+ Peripheral Pulses, No clubbing, cyanosis, or edema  PSYCH: AAOx3  SKIN: No rashes or lesions    LABS:                        11.7   18.67 )-----------( 258      ( 21 Jul 2017 05:00 )             34.5     07-21    141  |  103  |  12  ----------------------------<  75  4.0   |  24  |  1.07    Ca    9.1      21 Jul 2017 05:00  Phos  3.0     07-21  Mg     1.7     07-21                RADIOLOGY & ADDITIONAL TESTS:    Imaging Personally Reviewed:  Consultant(s) Notes Reviewed:    Care Discussed with Consultants/Other Providers: Patient is a 26y old  Male who presents with a chief complaint of Fever (18 Jul 2017 21:40)        SUBJECTIVE / OVERNIGHT EVENTS: Pt reports h/a after BD. Also reports continued production of blood tinged sputum. Reports breathing similar to yesterday with no change. Has been afebrile, denies chest pain, fever, h/a, dyspnea, abd pain, leg pain. Mother at bedside brought records, placed in chart.      MEDICATIONS  (STANDING):  sodium chloride 0.9%. 1000 milliLiter(s) (150 mL/Hr) IV Continuous <Continuous>  cefTRIAXone   IVPB 1 Gram(s) IV Intermittent every 24 hours  azithromycin  IVPB 500 milliGRAM(s) IV Intermittent every 24 hours    MEDICATIONS  (PRN):  acetaminophen   Tablet 650 milliGRAM(s) Oral every 6 hours PRN For Temp greater than 38 C (100.4 F)  morphine  - Injectable 2 milliGRAM(s) IV Push every 4 hours PRN Severe Pain (7 - 10)  ALBUTerol/ipratropium for Nebulization 3 milliLiter(s) Nebulizer every 6 hours PRN Shortness of Breath and/or Wheezing  benzonatate 100 milliGRAM(s) Oral every 8 hours PRN Cough  ondansetron Injectable 4 milliGRAM(s) IV Push every 4 hours PRN Nausea and/or Vomiting  acetaminophen   Tablet. 650 milliGRAM(s) Oral every 6 hours PRN pain          I&O's Summary      PHYSICAL EXAM  GENERAL: NAD, well-developed  HEAD:  Atraumatic, Normocephalic  EYES: EOMI, PERRLA, conjunctiva and sclera clear  NECK: Supple, No JVD  CHEST/LUNG: left side with crackles at base. No wheezing  HEART: Regular rate and rhythm; No murmurs, rubs, or gallops  ABDOMEN: Soft, Nontender, Nondistended; Bowel sounds present  EXTREMITIES:  2+ Peripheral Pulses, No clubbing, cyanosis, or edema  PSYCH: AAOx3  SKIN: No rashes or lesions    LABS:                        11.7   18.67 )-----------( 258      ( 21 Jul 2017 05:00 )             34.5     07-21    141  |  103  |  12  ----------------------------<  75  4.0   |  24  |  1.07    Ca    9.1      21 Jul 2017 05:00  Phos  3.0     07-21  Mg     1.7     07-21        RADIOLOGY & ADDITIONAL TESTS:    Imaging Personally Reviewed:  Consultant(s) Notes Reviewed:  Pulmonary  Care Discussed with Consultants/Other Providers: Allergy Immunology re: treatment options for C1 deficiency Patient is a 26y old  Male who presents with a chief complaint of Fever (18 Jul 2017 21:40)        SUBJECTIVE / OVERNIGHT EVENTS: Pt reports h/a after BD. Also reports continued production of blood tinged sputum. Reports breathing similar to yesterday with no change. Has been afebrile, denies chest pain, fever, h/a, dyspnea, abd pain, leg pain. Mother at bedside brought records, placed in chart.      MEDICATIONS  (STANDING):  sodium chloride 0.9%. 1000 milliLiter(s) (150 mL/Hr) IV Continuous <Continuous>  cefTRIAXone   IVPB 1 Gram(s) IV Intermittent every 24 hours  azithromycin  IVPB 500 milliGRAM(s) IV Intermittent every 24 hours    MEDICATIONS  (PRN):  acetaminophen   Tablet 650 milliGRAM(s) Oral every 6 hours PRN For Temp greater than 38 C (100.4 F)  morphine  - Injectable 2 milliGRAM(s) IV Push every 4 hours PRN Severe Pain (7 - 10)  ALBUTerol/ipratropium for Nebulization 3 milliLiter(s) Nebulizer every 6 hours PRN Shortness of Breath and/or Wheezing  benzonatate 100 milliGRAM(s) Oral every 8 hours PRN Cough  ondansetron Injectable 4 milliGRAM(s) IV Push every 4 hours PRN Nausea and/or Vomiting  acetaminophen   Tablet. 650 milliGRAM(s) Oral every 6 hours PRN pain          I&O's Summary      PHYSICAL EXAM  GENERAL: NAD, well-developed  HEAD:  Atraumatic, Normocephalic  EYES: EOMI, PERRLA, conjunctiva and sclera clear  NECK: Supple, No JVD  CHEST/LUNG: left side with crackles at base. No wheezing  HEART: Regular rate and rhythm; No murmurs, rubs, or gallops  ABDOMEN: Soft, Nontender, Nondistended; Bowel sounds present  EXTREMITIES:  2+ Peripheral Pulses, No clubbing, cyanosis, or edema  PSYCH: AAOx3  SKIN: No rashes or lesions    LABS:                        11.7   18.67 )-----------( 258      ( 21 Jul 2017 05:00 )             34.5   BANDS: 7%    07-21    141  |  103  |  12  ----------------------------<  75  4.0   |  24  |  1.07    Ca    9.1      21 Jul 2017 05:00  Phos  3.0     07-21  Mg     1.7     07-21        RADIOLOGY & ADDITIONAL TESTS:    Imaging Personally Reviewed:  Consultant(s) Notes Reviewed:  Pulmonary  Care Discussed with Consultants/Other Providers: Allergy Immunology re: treatment options for C1 deficiency

## 2017-07-21 NOTE — DISCHARGE NOTE ADULT - CARE PLAN
Principal Discharge DX:	Pneumonia of left lung due to infectious organism, unspecified part of lung  Goal:	Resolution  Instructions for follow-up, activity and diet:	You presented with fever, cough, high White blood cell count, and chest pain. You were found to have Community-acquired Pneumonia in the left lung. You were treated with IV antibiotics - Ceftriaxone and Azithromycin for 4 days. You have improved and are being discharged with Levaquin. Please take 750mg by mouth for 5 days. Please follow up with your primary care doctor within the next two weeks for evaluation and to ensure resolution of your pneumonia.  Secondary Diagnosis:	Sepsis, due to unspecified organism  Goal:	Resolution  Instructions for follow-up, activity and diet:	You presented with fever, cough, high WBCs and chest pain. You were found to have Pneumonia and treated with brochodilator therapy and IV antibiotics. Please continue to take your antibiotic - Levaquin 750mg daily by mouth for 5 days. Please follow up with your primary care physician within the next two weeks for further evaluation and to ensure resolution of your symptoms.  Secondary Diagnosis:	Complement deficiency  Goal:	management  Instructions for follow-up, activity and diet:	You have a complement deficiency. This means your immune system can be vulnerable puts you at higher for infections. Please go to your primary care doctor or go to ER when you have symptoms of infections such fever, cough, nigh sweats. You also require the pneumovax vaccine after your pneumonia resolves. Please follow up with your primary care provider within the next two weeks for further management. Also follow up with an Allergy and Immunology physician within the next two weeks to best manage your condition.Please contact [facility] Allergy Asthma and Immunology [phone] at [address] [place] [address] to schedule an appointment. Principal Discharge DX:	Pneumonia of left lung due to infectious organism, unspecified part of lung  Goal:	Resolution  Instructions for follow-up, activity and diet:	You presented with fever, cough, high White blood cell count, and chest pain. You were found to have Community-acquired Pneumonia in the left lung. You were treated with IV antibiotics - Ceftriaxone and Azithromycin for 4 days. You have improved and are being discharged with Levaquin. Please take 750mg by mouth for 5 days. Please follow up with your primary care doctor at the [facility],  within the next two weeks for evaluation and to ensure resolution of your pneumonia.  Secondary Diagnosis:	Sepsis, due to unspecified organism  Goal:	Resolution  Instructions for follow-up, activity and diet:	You presented with fever, cough, high WBCs and chest pain. You were found to have Pneumonia and treated with brochodilator therapy and IV antibiotics. Please continue to take your antibiotic - Levaquin 750mg daily by mouth for 5 days. Please follow up with your primary care physician within the next two weeks for further evaluation and to ensure resolution of your symptoms.  Secondary Diagnosis:	Complement deficiency  Goal:	management  Instructions for follow-up, activity and diet:	You have a complement deficiency. This means your immune system can be vulnerable puts you at higher for infections. Please go to your primary care doctor or go to ER when you have symptoms of infections such fever, cough, nigh sweats. You also require the pneumovax vaccine after your pneumonia resolves. Please follow up with your primary care provider within the next two weeks for further management. Also follow up with an Allergy and Immunology physician within the next two weeks to best manage your condition.Please contact [facility] Allergy Asthma and Immunology [phone] at [address] [place] [address] to schedule an appointment. Principal Discharge DX:	Pneumonia of left lung due to infectious organism, unspecified part of lung  Goal:	Resolution  Instructions for follow-up, activity and diet:	You presented with fever, cough, high White blood cell count, and chest pain. You were found to have Community-acquired Pneumonia in the left lung. You were treated with IV antibiotics - Ceftriaxone and Azithromycin for 4 days. You have improved and are being discharged with Levaquin. Please take 750mg by mouth for 5 days. Please follow up with your primary care doctor at the [facility],  within the next two weeks for evaluation and to ensure resolution of your pneumonia.  Secondary Diagnosis:	Sepsis, due to unspecified organism  Goal:	Resolution  Instructions for follow-up, activity and diet:	You presented with fever, cough, high WBCs and chest pain. You were found to have Pneumonia and treated with brochodilator therapy and IV antibiotics. Please continue to take your antibiotic - Levaquin 750mg daily by mouth for 5 days. Please follow up with your primary care physician within the next two weeks for further evaluation and to ensure resolution of your symptoms.  Secondary Diagnosis:	Complement deficiency  Goal:	management  Instructions for follow-up, activity and diet:	You have a complement deficiency. This means your immune system can be vulnerable puts you at higher for infections. Please go to your primary care doctor or go to ER when you have symptoms of infections such fever, cough, nigh sweats. You also require the pneumovax vaccine after your pneumonia resolves. Please follow up with your primary care provider within the next two weeks for further management. Also follow up with an Allergy and Immunology physician within the next two weeks to best manage your condition.Please contact [facility] Allergy Asthma and Immunology [phone] at [address] [place] [address] to schedule an appointment. Principal Discharge DX:	Pneumonia of left lung due to infectious organism, unspecified part of lung  Goal:	Resolution  Instructions for follow-up, activity and diet:	You presented with fever, cough, high White blood cell count, and chest pain. You were found to have Community-acquired Pneumonia in the left lung. You were treated with IV antibiotics - Ceftriaxone and Azithromycin for 4 days. You have improved and are being discharged with Levaquin. Please take 750mg by mouth for 5 days (your last dose will be on 7/28/17) . Please follow up with your primary care doctor at the [facility],  within the next two weeks for evaluation and to ensure resolution of your pneumonia.  Secondary Diagnosis:	Sepsis, due to unspecified organism  Goal:	Resolution  Instructions for follow-up, activity and diet:	You presented with fever, cough, high WBCs and chest pain. You were found to have Pneumonia and treated with brochodilator therapy and IV antibiotics. Please continue to take your antibiotic - Levaquin 750mg daily by mouth for 5 days. Please follow up with your primary care physician within the next two weeks for further evaluation and to ensure resolution of your symptoms.  Secondary Diagnosis:	Complement deficiency  Goal:	management  Instructions for follow-up, activity and diet:	You have a complement deficiency. This means your immune system can be vulnerable puts you at higher for infections. Please go to your primary care doctor or go to ER when you have symptoms of infections such fever, cough, nigh sweats. You also require the pneumovax vaccine after your pneumonia resolves. Please follow up with your primary care provider within the next two weeks for further management. Also follow up with an Allergy and Immunology physician within the next two weeks to best manage your condition.Please contact [facility] Allergy Asthma and Immunology [phone] at [address] [place] [address] to schedule an appointment. Principal Discharge DX:	Pneumonia of left lung due to infectious organism, unspecified part of lung  Goal:	Resolution  Instructions for follow-up, activity and diet:	You presented with fever, cough, high White blood cell count, and chest pain. You were found to have Community-acquired Pneumonia in the left lung. You were treated with IV antibiotics - Ceftriaxone and Azithromycin for 4 days. You have improved and are being discharged with Levaquin. Please take 750mg by mouth for 5 days (your last dose will be on 7/28/17) . Please follow up with your primary care doctor at the [facility],  within the next two weeks for evaluation and to ensure resolution of your pneumonia.  Secondary Diagnosis:	Sepsis, due to unspecified organism  Goal:	Resolution  Instructions for follow-up, activity and diet:	You presented with fever, cough, high WBCs and chest pain. You were found to have Pneumonia and treated with brochodilator therapy and IV antibiotics. Please continue to take your antibiotic - Levaquin 750mg daily by mouth for 5 days. Please follow up with your primary care physician within the next two weeks for further evaluation and to ensure resolution of your symptoms.  Secondary Diagnosis:	Complement deficiency  Goal:	management  Instructions for follow-up, activity and diet:	You have a complement deficiency. This means your immune system can be vulnerable puts you at higher for infections. Please go to your primary care doctor or go to ER when you have symptoms of infections such fever, cough, nigh sweats. You also require the pneumovax vaccine after your pneumonia resolves. Please follow up with your primary care provider within the next two weeks for further management. Also follow up with an Allergy and Immunology physician within the next two weeks to best manage your condition.Please contact [facility] Allergy Asthma and Immunology [phone] at [address] [place] [address] to schedule an appointment. Principal Discharge DX:	Pneumonia of left lung due to infectious organism, unspecified part of lung  Goal:	Resolution  Instructions for follow-up, activity and diet:	You presented with fever, cough, high White blood cell count, and chest pain. You were found to have Community-acquired Pneumonia in the left lung. You were treated with IV antibiotics - Ceftriaxone and Azithromycin for 4 days. You have improved and are being discharged with Levaquin. Please take 750mg by mouth for 5 days (your last dose will be on 7/28/17) . Please follow up with your primary care doctor at the [facility],  within the next two weeks for evaluation and to ensure resolution of your pneumonia.  Secondary Diagnosis:	Sepsis, due to unspecified organism  Goal:	Resolution  Instructions for follow-up, activity and diet:	You presented with fever, cough, high WBCs and chest pain. You were found to have Pneumonia and treated with brochodilator therapy and IV antibiotics. Please continue to take your antibiotic - Levaquin 750mg daily by mouth for 5 days. Please follow up with your primary care physician within the next two weeks for further evaluation and to ensure resolution of your symptoms.  Secondary Diagnosis:	Complement deficiency  Goal:	management  Instructions for follow-up, activity and diet:	You have a complement deficiency. This means your immune system can be vulnerable puts you at higher for infections. Please go to your primary care doctor or go to ER when you have symptoms of infections such fever, cough, nigh sweats. You also require the pneumovax vaccine after your pneumonia resolves. Please follow up with your primary care provider within the next two weeks for further management. Also follow up with an Allergy and Immunology physician within the next two weeks to best manage your condition.Please contact [facility] Allergy Asthma and Immunology [phone] at [address] [place] [address] to schedule an appointment. Principal Discharge DX:	Pneumonia of left lung due to infectious organism, unspecified part of lung  Goal:	Resolution  Instructions for follow-up, activity and diet:	You presented with fever, cough, high White blood cell count, and chest pain. You were found to have Community-acquired Pneumonia in the left lung. You were treated with IV antibiotics - Ceftriaxone and Azithromycin for 4 days. You have improved and are being discharged with Levaquin. Please take 750mg by mouth for 5 days (your last dose will be on 7/28/17) . Please follow up with your primary care doctor at the [facility] at [facility],  within the next two weeks for evaluation and to ensure resolution of your pneumonia.  Secondary Diagnosis:	Sepsis, due to unspecified organism  Goal:	Resolution  Instructions for follow-up, activity and diet:	You presented with fever, cough, high WBCs and chest pain. You were found to have Pneumonia and treated with brochodilator therapy and IV antibiotics. Please continue to take your antibiotic - Levaquin 750mg daily by mouth for 5 days. Please follow up with your primary care physician within the next two weeks for further evaluation and to ensure resolution of your symptoms.  Secondary Diagnosis:	Complement deficiency  Goal:	management  Instructions for follow-up, activity and diet:	You have a complement deficiency. This means your immune system can be vulnerable puts you at higher for infections. Please go to your primary care doctor or go to ER when you have symptoms of infections such fever, cough, nigh sweats. You also require the pneumovax vaccine after your pneumonia resolves. Please follow up with your primary care provider within the next two weeks for further management. Also follow up with an Allergy and Immunology physician within the next two weeks to best manage your condition.Please contact [facility] Allergy Asthma and Immunology [phone] at [address] [place] [address] to schedule an appointment. Principal Discharge DX:	Pneumonia of left lung due to infectious organism, unspecified part of lung  Goal:	Resolution  Instructions for follow-up, activity and diet:	You presented with fever, cough, high White blood cell count, and chest pain. You were found to have Community-acquired Pneumonia in the left lung. You were treated with IV antibiotics - Ceftriaxone and Azithromycin for 4 days. You have improved and are being discharged with Levaquin. Please take 750mg by mouth for 5 days (your last dose will be on 7/28/17) . Please follow up with your primary care doctor at the [facility],  within the next two weeks for evaluation and to ensure resolution of your pneumonia.  Secondary Diagnosis:	Sepsis, due to unspecified organism  Goal:	Resolution  Instructions for follow-up, activity and diet:	You presented with fever, cough, high WBCs and chest pain. You were found to have Pneumonia and treated with brochodilator therapy and IV antibiotics. Please continue to take your antibiotic - Levaquin 750mg daily by mouth for 5 days. Please follow up with your primary care physician within the next two weeks for further evaluation and to ensure resolution of your symptoms.  Secondary Diagnosis:	Complement deficiency  Goal:	management  Instructions for follow-up, activity and diet:	You have a complement deficiency. This means your immune system can be vulnerable puts you at higher for infections. Please go to your primary care doctor or go to ER when you have symptoms of infections such fever, cough, nigh sweats. You also require the pneumovax vaccine after your pneumonia resolves. Please follow up with your primary care provider within the next two weeks for further management. Also follow up with an Allergy and Immunology physician within the next two weeks to best manage your condition.Please contact [facility] Allergy Asthma and Immunology [phone] at [address] [place] [address] to schedule an appointment.

## 2017-07-21 NOTE — CONSULT NOTE ADULT - SUBJECTIVE AND OBJECTIVE BOX
CHIEF COMPLAINT:    HPI:  Pt is a 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, pneumonia (s/p surgery and chest tube 10 years ago) who was admitted for sepsis 2/2 pneumonia with growth of Strep aureus on cultures. Pt had subjective fever 1 day prior to admission, followed by episode of NBNB vomiting. This morning of presentation, he awoke with pleuritic chest pain, cough (productive of clear sputum) and SOB. Pt denies any known sick contacts or recent travel.    In the ED, the pt was found to have a rectal temp of 104 and a WBC count of 35, and was tachy. A CXR was done which found a  The pt was started on Ceftrixone and Azithromycin and admitted for sepsis 2/2 to pneumonia    Pt endorses hemoptysis that began the night of presentation. Pt reports that his blood tinged sputum was originally darker in color, but is much lighter and progressively less frequent than when it started. Pt also endorses a left lateral chest pain when he takes a deep breath and when he coughs. He reports this pain is well controlled with morphine and states he is requesting it less frequently. Pt denies any weight loss, night sweats sudden changes in vision, substernal chest pain, palpitations, abdominal pain, N/V/D, dysuria, hematuria, easy bruising, petechiae, bleeding from mucous membranes.     PAST MEDICAL & SURGICAL HISTORY:  Complement deficiency  History of lung surgery      FAMILY HISTORY:  No pertinent family history in first degree relatives      SOCIAL HISTORY:  Smoking: [ ] Never Smoked [ ] Former Smoker (__ packs x ___ years) [ ] Current Smoker  (__ packs x ___ years)  Substance Use: [ ] Never Used [ ] Used ____  EtOH Use:  Marital Status: [ ] Single [ ]  [ ]  [ ]   Sexual History:   Occupation:  Recent Travel:  Country of Birth:  Advance Directives:    Allergies    No Known Allergies    Intolerances        HOME MEDICATIONS:    REVIEW OF SYSTEMS:  Constitutional: [ ] negative [ ] fevers [ ] chills [ ] weight loss [ ] weight gain  HEENT: [ ] negative [ ] dry eyes [ ] eye irritation [ ] postnasal drip [ ] nasal congestion  CV: [ ] negative  [ ] chest pain [ ] orthopnea [ ] palpitations [ ] murmur  Resp: [ ] negative [ ] cough [ ] shortness of breath [ ] dyspnea [ ] wheezing [ ] sputum [ ] hemoptysis  GI: [ ] negative [ ] nausea [ ] vomiting [ ] diarrhea [ ] constipation [ ] abd pain [ ] dysphagia   : [ ] negative [ ] dysuria [ ] nocturia [ ] hematuria [ ] increased urinary frequency  Musculoskeletal: [ ] negative [ ] back pain [ ] myalgias [ ] arthralgias [ ] fracture  Skin: [ ] negative [ ] rash [ ] itch  Neurological: [ ] negative [ ] headache [ ] dizziness [ ] syncope [ ] weakness [ ] numbness  Psychiatric: [ ] negative [ ] anxiety [ ] depression  Endocrine: [ ] negative [ ] diabetes [ ] thyroid problem  Hematologic/Lymphatic: [ ] negative [ ] anemia [ ] bleeding problem  Allergic/Immunologic: [ ] negative [ ] itchy eyes [ ] nasal discharge [ ] hives [ ] angioedema  [ ] All other systems negative  [ ] Unable to assess ROS because ________    OBJECTIVE:  ICU Vital Signs Last 24 Hrs  T(C): 37 (21 Jul 2017 05:54), Max: 37.4 (20 Jul 2017 14:20)  T(F): 98.6 (21 Jul 2017 05:54), Max: 99.4 (20 Jul 2017 14:20)  HR: 70 (21 Jul 2017 05:54) (59 - 77)  BP: 120/72 (21 Jul 2017 05:54) (96/39 - 124/82)  BP(mean): --  ABP: --  ABP(mean): --  RR: 18 (21 Jul 2017 05:54) (18 - 18)  SpO2: 100% (21 Jul 2017 05:54) (100% - 100%)        CAPILLARY BLOOD GLUCOSE          PHYSICAL EXAM:  General:   HEENT:   Lymph Nodes:  Neck:   Respiratory:   Cardiovascular:   Abdomen:   Extremities:   Skin:   Neurological:  Psychiatry:    HOSPITAL MEDICATIONS:    cefTRIAXone   IVPB 1 Gram(s) IV Intermittent every 24 hours  azithromycin  IVPB 500 milliGRAM(s) IV Intermittent every 24 hours        ALBUTerol/ipratropium for Nebulization 3 milliLiter(s) Nebulizer every 6 hours PRN  benzonatate 100 milliGRAM(s) Oral every 8 hours PRN    acetaminophen   Tablet 650 milliGRAM(s) Oral every 6 hours PRN  morphine  - Injectable 2 milliGRAM(s) IV Push every 4 hours PRN  ondansetron Injectable 4 milliGRAM(s) IV Push every 4 hours PRN  acetaminophen   Tablet. 650 milliGRAM(s) Oral every 6 hours PRN          sodium chloride 0.9%. 1000 milliLiter(s) IV Continuous <Continuous>                LABS:                        11.7   18.67 )-----------( 258      ( 21 Jul 2017 05:00 )             34.5     Hgb Trend: 11.7<--, 12.7<--, 14.3<--, 15.5<--  07-21    141  |  103  |  12  ----------------------------<  75  4.0   |  24  |  1.07    Ca    9.1      21 Jul 2017 05:00  Phos  3.0     07-21  Mg     1.7     07-21      Creatinine Trend: 1.07<--, 1.20<--, 1.42<--, 1.86<--            MICROBIOLOGY:     RADIOLOGY:  [ ] Reviewed and interpreted by me    PULMONARY FUNCTION TESTS:    EKG: CHIEF COMPLAINT:    HPI:  Pt is a 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, multiple pneumonia (most recently 10 years ago s/p surgery and chest tube) who was admitted for sepsis 2/2 pneumonia with growth of Strep aureus on cultures. Pt had subjective fever 1 day prior to admission, followed by episode of NBNB vomiting. This morning of presentation, he awoke with pleuritic chest pain, cough (productive of clear sputum) and SOB. Pt denies any known sick contacts or recent travel.    In the ED, the pt was found to have a rectal temp of 104 and a WBC count of 35, and was tachy. A CXR was done which found a  The pt was started on Ceftrixone and Azithromycin and admitted for sepsis 2/2 to pneumonia    Pt endorses hemoptysis that began the night of presentation. Pt reports that his blood tinged sputum was originally darker in color, but is much lighter and progressively less frequent than when it started. Pt also endorses a left lateral chest pain when he takes a deep breath and when he coughs. He reports this pain is well controlled with morphine and states he is requesting it less frequently. Pt denies any weight loss, night sweats sudden changes in vision, substernal chest pain, palpitations, abdominal pain, N/V/D, dysuria, hematuria, easy bruising, petechiae, bleeding from mucous membranes.     PAST MEDICAL & SURGICAL HISTORY:  Complement deficiency  History of lung surgery      FAMILY HISTORY:  No pertinent family history in first degree relatives      SOCIAL HISTORY:  Smoking: [ ] Never Smoked [ ] Former Smoker (__ packs x ___ years) [ ] Current Smoker  (__ packs x ___ years)  Substance Use: [ ] Never Used [ ] Used ____  EtOH Use:  Marital Status: [ ] Single [ ]  [ ]  [ ]   Sexual History:   Occupation:  Recent Travel:  Country of Birth:  Advance Directives:    Allergies    No Known Allergies    Intolerances        HOME MEDICATIONS:    REVIEW OF SYSTEMS:  Constitutional: [ ] negative [ ] fevers [ ] chills [ ] weight loss [ ] weight gain  HEENT: [ ] negative [ ] dry eyes [ ] eye irritation [ ] postnasal drip [ ] nasal congestion  CV: [ ] negative  [ ] chest pain [ ] orthopnea [ ] palpitations [ ] murmur  Resp: [ ] negative [ ] cough [ ] shortness of breath [ ] dyspnea [ ] wheezing [ ] sputum [ ] hemoptysis  GI: [ ] negative [ ] nausea [ ] vomiting [ ] diarrhea [ ] constipation [ ] abd pain [ ] dysphagia   : [ ] negative [ ] dysuria [ ] nocturia [ ] hematuria [ ] increased urinary frequency  Musculoskeletal: [ ] negative [ ] back pain [ ] myalgias [ ] arthralgias [ ] fracture  Skin: [ ] negative [ ] rash [ ] itch  Neurological: [ ] negative [ ] headache [ ] dizziness [ ] syncope [ ] weakness [ ] numbness  Psychiatric: [ ] negative [ ] anxiety [ ] depression  Endocrine: [ ] negative [ ] diabetes [ ] thyroid problem  Hematologic/Lymphatic: [ ] negative [ ] anemia [ ] bleeding problem  Allergic/Immunologic: [ ] negative [ ] itchy eyes [ ] nasal discharge [ ] hives [ ] angioedema  [ ] All other systems negative  [ ] Unable to assess ROS because ________    OBJECTIVE:  ICU Vital Signs Last 24 Hrs  T(C): 37 (21 Jul 2017 05:54), Max: 37.4 (20 Jul 2017 14:20)  T(F): 98.6 (21 Jul 2017 05:54), Max: 99.4 (20 Jul 2017 14:20)  HR: 70 (21 Jul 2017 05:54) (59 - 77)  BP: 120/72 (21 Jul 2017 05:54) (96/39 - 124/82)  BP(mean): --  ABP: --  ABP(mean): --  RR: 18 (21 Jul 2017 05:54) (18 - 18)  SpO2: 100% (21 Jul 2017 05:54) (100% - 100%)        CAPILLARY BLOOD GLUCOSE          PHYSICAL EXAM:  General:   HEENT:   Lymph Nodes:  Neck:   Respiratory:   Cardiovascular:   Abdomen:   Extremities:   Skin:   Neurological:  Psychiatry:    HOSPITAL MEDICATIONS:    cefTRIAXone   IVPB 1 Gram(s) IV Intermittent every 24 hours  azithromycin  IVPB 500 milliGRAM(s) IV Intermittent every 24 hours        ALBUTerol/ipratropium for Nebulization 3 milliLiter(s) Nebulizer every 6 hours PRN  benzonatate 100 milliGRAM(s) Oral every 8 hours PRN    acetaminophen   Tablet 650 milliGRAM(s) Oral every 6 hours PRN  morphine  - Injectable 2 milliGRAM(s) IV Push every 4 hours PRN  ondansetron Injectable 4 milliGRAM(s) IV Push every 4 hours PRN  acetaminophen   Tablet. 650 milliGRAM(s) Oral every 6 hours PRN          sodium chloride 0.9%. 1000 milliLiter(s) IV Continuous <Continuous>                LABS:                        11.7   18.67 )-----------( 258      ( 21 Jul 2017 05:00 )             34.5     Hgb Trend: 11.7<--, 12.7<--, 14.3<--, 15.5<--  07-21    141  |  103  |  12  ----------------------------<  75  4.0   |  24  |  1.07    Ca    9.1      21 Jul 2017 05:00  Phos  3.0     07-21  Mg     1.7     07-21      Creatinine Trend: 1.07<--, 1.20<--, 1.42<--, 1.86<--            MICROBIOLOGY:     RADIOLOGY:  [ ] Reviewed and interpreted by me    PULMONARY FUNCTION TESTS:    EKG: CHIEF COMPLAINT:    HPI:  Pt is a 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, multiple pneumonias (most recently 10 years ago s/p surgery and chest tube), and cellulitis, who was admitted for sepsis 2/2 pneumonia with growth of Strep aureus on sputum cultures. Pt had subjective fever 1 day prior to admission, followed by episode of NBNB vomiting. This morning of presentation, he awoke with pleuritic chest pain, cough (productive of clear sputum) and SOB. Pt denies any known sick contacts or recent travel.    In the ED, the pt was found to have a rectal temp of 104 and a WBC count of 35, and was tachy. A CXR was done which found a  The pt was started on Ceftrixone and Azithromycin and admitted for sepsis 2/2 to pneumonia    Pt endorses hemoptysis that began the night of presentation. Pt reports that his blood tinged sputum was originally darker in color, but is much lighter and progressively less frequent than when it started. Pt also endorses a left lateral chest pain when he takes a deep breath and when he coughs. He reports this pain is well controlled with morphine and states he is requesting it less frequently. Pt denies any weight loss, night sweats sudden changes in vision, substernal chest pain, palpitations, abdominal pain, N/V/D, dysuria, hematuria, easy bruising, petechiae, bleeding from mucous membranes.     PAST MEDICAL & SURGICAL HISTORY:  Complement deficiency  History of lung surgery      FAMILY HISTORY:  No pertinent family history in first degree relatives      SOCIAL HISTORY:  Smoking: [ ] Never Smoked [ ] Former Smoker (__ packs x ___ years) [ ] Current Smoker  (__ packs x ___ years)  Substance Use: [ ] Never Used [ ] Used ____  EtOH Use:  Marital Status: [ ] Single [ ]  [ ]  [ ]   Sexual History:   Occupation:  Recent Travel:  Country of Birth:  Advance Directives:    Allergies    No Known Allergies    Intolerances        HOME MEDICATIONS:    REVIEW OF SYSTEMS:  Constitutional: [ ] negative [ ] fevers [ ] chills [ ] weight loss [ ] weight gain  HEENT: [ ] negative [ ] dry eyes [ ] eye irritation [ ] postnasal drip [ ] nasal congestion  CV: [ ] negative  [ ] chest pain [ ] orthopnea [ ] palpitations [ ] murmur  Resp: [ ] negative [ ] cough [ ] shortness of breath [ ] dyspnea [ ] wheezing [ ] sputum [ ] hemoptysis  GI: [ ] negative [ ] nausea [ ] vomiting [ ] diarrhea [ ] constipation [ ] abd pain [ ] dysphagia   : [ ] negative [ ] dysuria [ ] nocturia [ ] hematuria [ ] increased urinary frequency  Musculoskeletal: [ ] negative [ ] back pain [ ] myalgias [ ] arthralgias [ ] fracture  Skin: [ ] negative [ ] rash [ ] itch  Neurological: [ ] negative [ ] headache [ ] dizziness [ ] syncope [ ] weakness [ ] numbness  Psychiatric: [ ] negative [ ] anxiety [ ] depression  Endocrine: [ ] negative [ ] diabetes [ ] thyroid problem  Hematologic/Lymphatic: [ ] negative [ ] anemia [ ] bleeding problem  Allergic/Immunologic: [ ] negative [ ] itchy eyes [ ] nasal discharge [ ] hives [ ] angioedema  [ ] All other systems negative  [ ] Unable to assess ROS because ________    OBJECTIVE:  ICU Vital Signs Last 24 Hrs  T(C): 37 (21 Jul 2017 05:54), Max: 37.4 (20 Jul 2017 14:20)  T(F): 98.6 (21 Jul 2017 05:54), Max: 99.4 (20 Jul 2017 14:20)  HR: 70 (21 Jul 2017 05:54) (59 - 77)  BP: 120/72 (21 Jul 2017 05:54) (96/39 - 124/82)  BP(mean): --  ABP: --  ABP(mean): --  RR: 18 (21 Jul 2017 05:54) (18 - 18)  SpO2: 100% (21 Jul 2017 05:54) (100% - 100%)        CAPILLARY BLOOD GLUCOSE          PHYSICAL EXAM:  General:   HEENT:   Lymph Nodes:  Neck:   Respiratory:   Cardiovascular:   Abdomen:   Extremities:   Skin:   Neurological:  Psychiatry:    HOSPITAL MEDICATIONS:    cefTRIAXone   IVPB 1 Gram(s) IV Intermittent every 24 hours  azithromycin  IVPB 500 milliGRAM(s) IV Intermittent every 24 hours        ALBUTerol/ipratropium for Nebulization 3 milliLiter(s) Nebulizer every 6 hours PRN  benzonatate 100 milliGRAM(s) Oral every 8 hours PRN    acetaminophen   Tablet 650 milliGRAM(s) Oral every 6 hours PRN  morphine  - Injectable 2 milliGRAM(s) IV Push every 4 hours PRN  ondansetron Injectable 4 milliGRAM(s) IV Push every 4 hours PRN  acetaminophen   Tablet. 650 milliGRAM(s) Oral every 6 hours PRN          sodium chloride 0.9%. 1000 milliLiter(s) IV Continuous <Continuous>                LABS:                        11.7   18.67 )-----------( 258      ( 21 Jul 2017 05:00 )             34.5     Hgb Trend: 11.7<--, 12.7<--, 14.3<--, 15.5<--  07-21    141  |  103  |  12  ----------------------------<  75  4.0   |  24  |  1.07    Ca    9.1      21 Jul 2017 05:00  Phos  3.0     07-21  Mg     1.7     07-21      Creatinine Trend: 1.07<--, 1.20<--, 1.42<--, 1.86<--            MICROBIOLOGY:     RADIOLOGY:  [ ] Reviewed and interpreted by me    PULMONARY FUNCTION TESTS:    EKG: CHIEF COMPLAINT:    HPI:  Pt is a 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, multiple pneumonias (most recently 10 years ago s/p surgery and chest tube), and cellulitis, who was admitted for sepsis 2/2 pneumonia with growth of Strep aureus on sputum cultures. Pt had subjective fever 1 day prior to admission, followed by episode of NBNB vomiting. This morning of presentation, he awoke with pleuritic chest pain, cough (productive of clear sputum) and SOB. Pt denies any known sick contacts or recent travel.    In the ED, the pt was found to have a rectal temp of 104 and a WBC count of 35, and was tachy. A CXR was done which found a  The pt was started on Ceftrixone and Azithromycin and admitted for sepsis 2/2 to pneumonia    Pt endorses hemoptysis that began the night of presentation. Pt reports that his blood tinged sputum was originally darker in color, but is much lighter and progressively less frequent than when it started. Pt also endorses a left lateral chest pain when he takes a deep breath and when he coughs. He reports this pain is well controlled with morphine and states he is requesting it less frequently. Pt denies any weight loss, night sweats sudden changes in vision, substernal chest pain, palpitations, abdominal pain, N/V/D, dysuria, hematuria, easy bruising, petechiae, bleeding from mucous membranes.     PAST MEDICAL & SURGICAL HISTORY:  Complement deficiency  History of lung surgery      FAMILY HISTORY:  No pertinent family history in first degree relatives      SOCIAL HISTORY:  Smoking: [X] Never Smoked   Substance Use: [X] Never Used [ ] Used ____  EtOH Use: Social      Allergies    No Known Allergies    Intolerances        HOME MEDICATIONS:    REVIEW OF SYSTEMS:  See above    OBJECTIVE:  ICU Vital Signs Last 24 Hrs  T(C): 37 (21 Jul 2017 05:54), Max: 37.4 (20 Jul 2017 14:20)  T(F): 98.6 (21 Jul 2017 05:54), Max: 99.4 (20 Jul 2017 14:20)  HR: 70 (21 Jul 2017 05:54) (59 - 77)  BP: 120/72 (21 Jul 2017 05:54) (96/39 - 124/82)  BP(mean): --  ABP: --  ABP(mean): --  RR: 18 (21 Jul 2017 05:54) (18 - 18)  SpO2: 100% (21 Jul 2017 05:54) (100% - 100%)        CAPILLARY BLOOD GLUCOSE        PHYSICAL EXAM:  GENERAL: NAD, well-developed  HEAD:  Atraumatic, Normocephalic  EYES: EOMI, PERRLA, conjunctiva and sclera clear  NECK: Supple, No JVD  CHEST/LUNG: crackles in the left lung base  HEART: Regular rate and rhythm; No murmurs, rubs, or gallops  ABDOMEN: Soft, Nontender, Nondistended; Bowel sounds present  EXTREMITIES:  2+ Peripheral Pulses, No cyanosis, or edema. Clubbing noted   PSYCH: AAOx3  NEUROLOGY: non-focal  SKIN: No rashes or lesions      HOSPITAL MEDICATIONS:    cefTRIAXone   IVPB 1 Gram(s) IV Intermittent every 24 hours  azithromycin  IVPB 500 milliGRAM(s) IV Intermittent every 24 hours        ALBUTerol/ipratropium for Nebulization 3 milliLiter(s) Nebulizer every 6 hours PRN  benzonatate 100 milliGRAM(s) Oral every 8 hours PRN    acetaminophen   Tablet 650 milliGRAM(s) Oral every 6 hours PRN  morphine  - Injectable 2 milliGRAM(s) IV Push every 4 hours PRN  ondansetron Injectable 4 milliGRAM(s) IV Push every 4 hours PRN  acetaminophen   Tablet. 650 milliGRAM(s) Oral every 6 hours PRN          sodium chloride 0.9%. 1000 milliLiter(s) IV Continuous <Continuous>                LABS:                        11.7   18.67 )-----------( 258      ( 21 Jul 2017 05:00 )             34.5     Hgb Trend: 11.7<--, 12.7<--, 14.3<--, 15.5<--  07-21    141  |  103  |  12  ----------------------------<  75  4.0   |  24  |  1.07    Ca    9.1      21 Jul 2017 05:00  Phos  3.0     07-21  Mg     1.7     07-21      Creatinine Trend: 1.07<--, 1.20<--, 1.42<--, 1.86<--            MICROBIOLOGY:     RADIOLOGY  CXR IMPRESSION:   Questionable retrocardiac opacity, may represent infiltrate versus   overlapping osseous structures. Please correlate clinically.     PULMONARY FUNCTION TESTS:    EKG:

## 2017-07-21 NOTE — DISCHARGE NOTE ADULT - NS AS ACTIVITY OBS
Walking-Outdoors allowed/Walking-Indoors allowed/Stairs allowed/Return to Work/School allowed/Driving allowed/Sex allowed/Showering allowed/Bathing allowed

## 2017-07-21 NOTE — DISCHARGE NOTE ADULT - HOSPITAL COURSE
HPI: 27 yo M with h/o complement deficiency, prior PNA c/b chest tube placement 10 yrs ago p/w fever. Pt had subjective fever yesterday followed by episode of NBNB vomiting. No abd pain, no diarrhea. This morning he woke up with pleuritic chest pain, cough productive clear sputum and SOB. He has no known sick contacts or recent travel. no hemoptysis, leg swelling or pain.     Hospital Course: You presented to the [facility] ED with BP 96/57 HR98 T99.6 RR18 and 95% oxygen saturation on Room air. You received  ceftriaxone, azithromycin, tylenol, zofran, and 2L normal saline. You were admitted to Medicine for further evaluation. A chest xray showed "retrocardiac opacity" representing pneumonia. You were continued on azithromycin and ceftriaxone IV for community-acquired pneumonia. Blood cultures were drawn and showed no growth. A sputum gram stain showed gram positive cocci in pairs indicating likely streptococcus pneumoniae. You respiratory viral panel was negative. You also received breathing therapy with albuterol/ipratroium nebulizer and benzonatate for cough. Pulmonology team was consulted for further recommendations. The pulmonology team advised no further interventions at the time. Allergy/Immunology was also consulted. They recommended to continue antibiotic treatment no further interventions. You improved clinically throughout hospitaiization On 7/21/17, you were deemed clinically ready for discharge with Levaquin 750mg daily by mouth for 5 days. HPI: 27 yo M with h/o complement deficiency, prior PNA c/b chest tube placement 10 yrs ago p/w fever. Pt had subjective fever yesterday followed by episode of NBNB vomiting. No abd pain, no diarrhea. This morning he woke up with pleuritic chest pain, cough productive clear sputum and SOB. He has no known sick contacts or recent travel. no hemoptysis, leg swelling or pain.     Hospital Course: You presented to the [facility] ED with BP 96/57 HR98 T99.6 RR18 and 95% oxygen saturation on Room air. You received  ceftriaxone, azithromycin, tylenol, zofran, and 2L normal saline. You were admitted to Medicine for further evaluation. A chest xray showed "retrocardiac opacity" representing pneumonia. You were continued on azithromycin and ceftriaxone IV for community-acquired pneumonia. Blood cultures were drawn and showed no growth. A sputum gram stain showed gram positive cocci in pairs indicating likely streptococcus pneumoniae. You respiratory viral panel was negative. You also received breathing therapy with albuterol/ipratroium nebulizer and benzonatate for cough. Pulmonology team was consulted for further recommendations. The pulmonology team advised no further interventions at the time. Allergy/Immunology was also consulted. They recommended to continue antibiotic treatment no further interventions. You improved clinically throughout hospitaiization On 7/23/17, you were deemed clinically ready for discharge with Levaquin 750mg daily by mouth for 5 days (to be completed through 7/28/17). HPI: 27 yo M with h/o complement deficiency, prior PNA c/b chest tube placement 10 yrs ago p/w fever. Pt had subjective fever yesterday followed by episode of NBNB vomiting. No abd pain, no diarrhea. This morning he woke up with pleuritic chest pain, cough productive clear sputum and SOB. He has no known sick contacts or recent travel. no hemoptysis, leg swelling or pain.     Hospital Course: You presented to the [facility] ED with BP 96/57 HR98 T99.6 RR18 and 95% oxygen saturation on Room air. You received  ceftriaxone, azithromycin, tylenol, zofran, and 2L normal saline. You were admitted to Medicine for further evaluation. A chest xray showed "retrocardiac opacity" representing pneumonia. You were continued on azithromycin and ceftriaxone IV for community-acquired pneumonia. Blood cultures were drawn and showed no growth. A sputum gram stain showed gram positive cocci in pairs indicating likely streptococcus pneumoniae. You respiratory viral panel was negative. You also received breathing therapy with albuterol/ipratroium nebulizer and benzonatate for cough. Pulmonology team was consulted for further recommendations. The pulmonology team advised no further interventions at the time. Allergy/Immunology was also consulted. They recommended to continue antibiotic treatment no further interventions. You improved clinically throughout hospitaiization On 7/23/17, you were deemed clinically ready for discharge with Levaquin 500mg daily by mouth for 5 days (to be completed through 7/28/17). He was educated on the importance of following up with allergy and immunology in the next 2 weeks.

## 2017-07-21 NOTE — PROGRESS NOTE ADULT - PROBLEM SELECTOR PLAN 3
-f/u with pediatrician to discuss type of deficiency and treatment  -ordered C3 complement, c4 comp, diph ab, H inf ab, strep Pneum ab, tetanus ab,  total hemolytic comp, AH50,  and mannose binding lectin  -C3 complement low at 32  -c4 nml  -Quantitative IgG, IgA, IgM nml  -pending AI consult

## 2017-07-21 NOTE — PROGRESS NOTE ADULT - PROBLEM SELECTOR PLAN 2
- Sepsis 2/2 CAP PNA in setting of complement deficiency   - bl cx- NGTD 48 hr  - sputum gram stain: GPCPR - Gram Pos Cocci in Pairs , gram variable rods  - supportive care- nebs, cough suppressant, Tylenol, fluids. morphine for chest pain  -Elena neg

## 2017-07-21 NOTE — PROGRESS NOTE ADULT - PROBLEM SELECTOR PLAN 1
- Sepsis 2/2 PNA in setting of complement deficiency   -leukocytosis improving, down to 18  - Continue ceftriaxone and azithromycin for CAP; day 4  - blood culture NGTD  --sputum gram stain with G+ cocci pairs and G- rods  -RVP neg

## 2017-07-21 NOTE — PROGRESS NOTE ADULT - ASSESSMENT
27 yo M with h/o complement deficiency, asthma, prior PNA c/b chest tube placement 10 yrs ago p/w fever, cough and SOB 2/2 severe sepsis 2/2 CAP. Abx Day 4, on azithromycin and ctx

## 2017-07-21 NOTE — DISCHARGE NOTE ADULT - CONDITION (STATED IN TERMS THAT PERMIT A SPECIFIC MEASURABLE COMPARISON WITH CONDITION ON ADMISSION):
Pt is clinically improved and medically optimized for discharge home with close outpatient follow-up.

## 2017-07-21 NOTE — DISCHARGE NOTE ADULT - CONDITIONS AT DISCHARGE
Pt is alert v/s stable , no c/o pain or discomfort, discharge teaching done , will f/u with PCP in  community

## 2017-07-21 NOTE — DISCHARGE NOTE ADULT - CARE PROVIDER_API CALL
Ghazala Vasquez), Allergy and Immunology  [address] [place] [address]  Phone: [phone]  Fax: [phone]

## 2017-07-21 NOTE — CONSULT NOTE ADULT - ASSESSMENT
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, multiple pneumonias (most recently 10 years ago s/p surgery and chest tube), and cellulitis, who was admitted for sepsis 2/2 pneumonia with growth of Strep aureus on sputum cultures    #Hemoptysis  - likely secondary to pneumonia  - frequency and severity of hemoptysis improving  - strongly encouraged pt to establish followup with immunologist and pcp as he needs regular vaccinations and management of Complement C3 defeciency  - recommend continuation of azithromycin and ceftriaxone while inpatient, would discharge pt on Levaquin for a total of 5 more days abx therapy  - please call with any questions 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, multiple pneumonias (most recently 10 years ago s/p surgery and chest tube), and cellulitis, who was admitted for sepsis 2/2 pneumonia with growth of Strep aureus on sputum cultures    #Hemoptysis  - likely secondary to pneumonia  - frequency and severity of hemoptysis decreasing  - strongly encouraged pt to establish followup with immunologist and pcp as he needs regular vaccinations and management of Complement C3 defeciency  - recommend continuation of azithromycin and ceftriaxone while inpatient, would discharge pt on Levaquin for a total of 5 more days abx therapy  - please call with any questions

## 2017-07-21 NOTE — DISCHARGE NOTE ADULT - PATIENT PORTAL LINK FT
“You can access the [facility] Patient Portal, offered by [facility], by registering with the following website: [email or web address]”

## 2017-07-21 NOTE — DISCHARGE NOTE ADULT - MEDICATION SUMMARY - MEDICATIONS TO TAKE
I will START or STAY ON the medications listed below when I get home from the hospital:    benzonatate 100 mg oral capsule  -- 1 cap(s) by mouth every 8 hours, As needed, Cough MDD:6  -- Indication: For Pneumonia of left lung due to infectious organism, unspecified part of lung    Levaquin 750 mg oral tablet  -- 1 tab(s) by mouth once a day for community-acquired pneumonia  -- Avoid prolonged or excessive exposure to direct and/or artificial sunlight while taking this medication.  Do not take dairy products, antacids, or iron preparations within one hour of this medication.  Finish all this medication unless otherwise directed by prescriber.  May cause drowsiness or dizziness.  Medication should be taken with plenty of water.    -- Indication: For Pneumonia of left lung due to infectious organism, unspecified part of lung I will START or STAY ON the medications listed below when I get home from the hospital:    benzonatate 100 mg oral capsule  -- 1 cap(s) by mouth every 8 hours, As needed, Cough MDD:6  -- Indication: For Cough    Levaquin 750 mg oral tablet  -- 1 tab(s) by mouth once a day for 5 days to treat pneumonia  -- Avoid prolonged or excessive exposure to direct and/or artificial sunlight while taking this medication.  Do not take dairy products, antacids, or iron preparations within one hour of this medication.  Finish all this medication unless otherwise directed by prescriber.  May cause drowsiness or dizziness.  Medication should be taken with plenty of water.    -- Indication: For Pneumonia of left lung due to infectious organism, unspecified part of lung I will START or STAY ON the medications listed below when I get home from the hospital:    ibuprofen 600 mg oral tablet  -- 1 tab(s) by mouth every 8 hours as need for pain. Please take with food.   -- Do not take this drug if you are pregnant.  It is very important that you take or use this exactly as directed.  Do not skip doses or discontinue unless directed by your doctor.  May cause drowsiness or dizziness.  Obtain medical advice before taking any non-prescription drugs as some may affect the action of this medication.  Take with food or milk.    -- Indication: For Chest pain     benzonatate 100 mg oral capsule  -- 1 cap(s) by mouth every 8 hours, As needed, Cough MDD:6  -- Indication: For Cough    Levaquin 750 mg oral tablet  -- 1 tab(s) by mouth once a day for 5 days to treat pneumonia  -- Avoid prolonged or excessive exposure to direct and/or artificial sunlight while taking this medication.  Do not take dairy products, antacids, or iron preparations within one hour of this medication.  Finish all this medication unless otherwise directed by prescriber.  May cause drowsiness or dizziness.  Medication should be taken with plenty of water.    -- Indication: For Pneumonia of left lung due to infectious organism, unspecified part of lung

## 2017-07-22 LAB
BASOPHILS # BLD AUTO: 0.07 K/UL — SIGNIFICANT CHANGE UP (ref 0–0.2)
BASOPHILS NFR BLD AUTO: 0.6 % — SIGNIFICANT CHANGE UP (ref 0–2)
BUN SERPL-MCNC: 14 MG/DL — SIGNIFICANT CHANGE UP (ref 7–23)
CALCIUM SERPL-MCNC: 9.3 MG/DL — SIGNIFICANT CHANGE UP (ref 8.4–10.5)
CHLORIDE SERPL-SCNC: 102 MMOL/L — SIGNIFICANT CHANGE UP (ref 98–107)
CO2 SERPL-SCNC: 25 MMOL/L — SIGNIFICANT CHANGE UP (ref 22–31)
CREAT SERPL-MCNC: 1.04 MG/DL — SIGNIFICANT CHANGE UP (ref 0.5–1.3)
EOSINOPHIL # BLD AUTO: 0.24 K/UL — SIGNIFICANT CHANGE UP (ref 0–0.5)
EOSINOPHIL NFR BLD AUTO: 2.1 % — SIGNIFICANT CHANGE UP (ref 0–6)
GLUCOSE SERPL-MCNC: 90 MG/DL — SIGNIFICANT CHANGE UP (ref 70–99)
HCT VFR BLD CALC: 36.4 % — LOW (ref 39–50)
HGB BLD-MCNC: 12.2 G/DL — LOW (ref 13–17)
IMM GRANULOCYTES # BLD AUTO: 0.16 # — SIGNIFICANT CHANGE UP
IMM GRANULOCYTES NFR BLD AUTO: 1.4 % — SIGNIFICANT CHANGE UP (ref 0–1.5)
LYMPHOCYTES # BLD AUTO: 2.71 K/UL — SIGNIFICANT CHANGE UP (ref 1–3.3)
LYMPHOCYTES # BLD AUTO: 23.5 % — SIGNIFICANT CHANGE UP (ref 13–44)
MAGNESIUM SERPL-MCNC: 1.6 MG/DL — SIGNIFICANT CHANGE UP (ref 1.6–2.6)
MCHC RBC-ENTMCNC: 28.6 PG — SIGNIFICANT CHANGE UP (ref 27–34)
MCHC RBC-ENTMCNC: 33.5 % — SIGNIFICANT CHANGE UP (ref 32–36)
MCV RBC AUTO: 85.2 FL — SIGNIFICANT CHANGE UP (ref 80–100)
MONOCYTES # BLD AUTO: 1.32 K/UL — HIGH (ref 0–0.9)
MONOCYTES NFR BLD AUTO: 11.5 % — SIGNIFICANT CHANGE UP (ref 2–14)
NEUTROPHILS # BLD AUTO: 7.01 K/UL — SIGNIFICANT CHANGE UP (ref 1.8–7.4)
NEUTROPHILS NFR BLD AUTO: 60.9 % — SIGNIFICANT CHANGE UP (ref 43–77)
NRBC # FLD: 0 — SIGNIFICANT CHANGE UP
PHOSPHATE SERPL-MCNC: 4 MG/DL — SIGNIFICANT CHANGE UP (ref 2.5–4.5)
PLATELET # BLD AUTO: 281 K/UL — SIGNIFICANT CHANGE UP (ref 150–400)
PMV BLD: 9.3 FL — SIGNIFICANT CHANGE UP (ref 7–13)
POTASSIUM SERPL-MCNC: 3.8 MMOL/L — SIGNIFICANT CHANGE UP (ref 3.5–5.3)
POTASSIUM SERPL-SCNC: 3.8 MMOL/L — SIGNIFICANT CHANGE UP (ref 3.5–5.3)
RBC # BLD: 4.27 M/UL — SIGNIFICANT CHANGE UP (ref 4.2–5.8)
RBC # FLD: 11.8 % — SIGNIFICANT CHANGE UP (ref 10.3–14.5)
SODIUM SERPL-SCNC: 140 MMOL/L — SIGNIFICANT CHANGE UP (ref 135–145)
WBC # BLD: 11.51 K/UL — HIGH (ref 3.8–10.5)
WBC # FLD AUTO: 11.51 K/UL — HIGH (ref 3.8–10.5)

## 2017-07-22 PROCEDURE — 99233 SBSQ HOSP IP/OBS HIGH 50: CPT | Mod: GC

## 2017-07-22 RX ORDER — LANOLIN ALCOHOL/MO/W.PET/CERES
3 CREAM (GRAM) TOPICAL AT BEDTIME
Qty: 0 | Refills: 0 | Status: DISCONTINUED | OUTPATIENT
Start: 2017-07-22 | End: 2017-07-23

## 2017-07-22 RX ADMIN — MORPHINE SULFATE 2 MILLIGRAM(S): 50 CAPSULE, EXTENDED RELEASE ORAL at 15:56

## 2017-07-22 RX ADMIN — MORPHINE SULFATE 2 MILLIGRAM(S): 50 CAPSULE, EXTENDED RELEASE ORAL at 21:46

## 2017-07-22 RX ADMIN — MORPHINE SULFATE 2 MILLIGRAM(S): 50 CAPSULE, EXTENDED RELEASE ORAL at 22:01

## 2017-07-22 RX ADMIN — Medication 3 MILLIGRAM(S): at 03:00

## 2017-07-22 RX ADMIN — MORPHINE SULFATE 2 MILLIGRAM(S): 50 CAPSULE, EXTENDED RELEASE ORAL at 16:15

## 2017-07-22 NOTE — PROGRESS NOTE ADULT - PROBLEM SELECTOR PLAN 3
-pediatrician reports complement def in alternate b pathway: records show AH50 at 0  -allergy/imm advised same txt for CAP, no other intervention  -C3 complement low at 32  -c4 nml  -Quantitative IgG, IgA, IgM nml  -pending AI consult -pediatrician reports complement def in alternate b pathway: records show AH50 at 0  -allergy/imm advised same txt for CAP, no other intervention  -total hemolytic complement elevated -pediatrician reports complement def in alternate b pathway: records show AH50 at 0  -allergy/imm advised same txt for CAP, no other intervention  -will require outpatient f/u at  clinic for prophylaxis and management, as per AI  -total hemolytic complement elevated

## 2017-07-22 NOTE — PROGRESS NOTE ADULT - ASSESSMENT
27 yo M with h/o complement deficiency, asthma, prior PNA c/b chest tube placement 10 yrs ago p/w fever, cough and SOB 2/2 severe sepsis 2/2 CAP. Abx Day 5, switched to Levaquin PO day 2 27 yo M with h/o complement deficiency, asthma, prior PNA c/b chest tube placement 10 yrs ago p/w fever, cough and SOB 2/2 severe sepsis 2/2 CAP. Abx Day 5, Levaquin PO day 2(of 5)

## 2017-07-22 NOTE — PROGRESS NOTE ADULT - PROBLEM SELECTOR PLAN 1
- Sepsis 2/2 PNA in setting of complement deficiency   -leukocytosis improving, down to 18  - Levaquin PO day 2  - blood culture NGTD - Sepsis 2/2 PNA in setting of complement deficiency   -leukocytosis improving, at 11.5  - Levaquin PO day 2  - blood culture NGTD - Sepsis 2/2 PNA in setting of complement deficiency   -leukocytosis improving, at 11.5  - Levaquin PO day 2(of 5)  - blood culture NGTD

## 2017-07-22 NOTE — PROGRESS NOTE ADULT - SUBJECTIVE AND OBJECTIVE BOX
Patient is a 26y old  Male who presents with a chief complaint of Fever (21 Jul 2017 14:16)        SUBJECTIVE / OVERNIGHT EVENTS:      MEDICATIONS  (STANDING):  sodium chloride 0.9%. 1000 milliLiter(s) (150 mL/Hr) IV Continuous <Continuous>  levoFLOXacin  Tablet 500 milliGRAM(s) Oral every 24 hours    MEDICATIONS  (PRN):  acetaminophen   Tablet 650 milliGRAM(s) Oral every 6 hours PRN For Temp greater than 38 C (100.4 F)  morphine  - Injectable 2 milliGRAM(s) IV Push every 4 hours PRN Severe Pain (7 - 10)  ALBUTerol/ipratropium for Nebulization 3 milliLiter(s) Nebulizer every 6 hours PRN Shortness of Breath and/or Wheezing  benzonatate 100 milliGRAM(s) Oral every 8 hours PRN Cough  ondansetron Injectable 4 milliGRAM(s) IV Push every 4 hours PRN Nausea and/or Vomiting  acetaminophen   Tablet. 650 milliGRAM(s) Oral every 6 hours PRN pain  melatonin 3 milliGRAM(s) Oral at bedtime PRN Insomnia      T(C): 36.8 (07-22-17 @ 06:27), Max: 37.4 (07-21-17 @ 14:57)  HR: 60 (07-22-17 @ 06:27) (60 - 69)  BP: 121/77 (07-22-17 @ 06:27) (121/77 - 124/87)  RR: 18 (07-22-17 @ 06:27) (17 - 18)  SpO2: 98% (07-22-17 @ 06:27) (18% - 99%)  CAPILLARY BLOOD GLUCOSE        I&O's Summary    22 Jul 2017 07:01  -  22 Jul 2017 08:17  --------------------------------------------------------  IN: 0 mL / OUT: 800 mL / NET: -800 mL        PHYSICAL EXAM  GENERAL: NAD, well-developed  HEAD:  Atraumatic, Normocephalic  EYES: EOMI, PERRLA, conjunctiva and sclera clear  NECK: Supple, No JVD  CHEST/LUNG: Clear to auscultation bilaterally; No wheeze  HEART: Regular rate and rhythm; No murmurs, rubs, or gallops  ABDOMEN: Soft, Nontender, Nondistended; Bowel sounds present  EXTREMITIES:  2+ Peripheral Pulses, No clubbing, cyanosis, or edema  PSYCH: AAOx3  SKIN: No rashes or lesions    LABS:                        12.2   x     )-----------( 281      ( 22 Jul 2017 07:38 )             36.4     07-21    141  |  103  |  12  ----------------------------<  75  4.0   |  24  |  1.07    Ca    9.1      21 Jul 2017 05:00  Phos  3.0     07-21  Mg     1.7     07-21                RADIOLOGY & ADDITIONAL TESTS:    Imaging Personally Reviewed:  Consultant(s) Notes Reviewed:    Care Discussed with Consultants/Other Providers: Patient is a 26y old  Male who presents with a chief complaint of Fever (21 Jul 2017 14:16)        SUBJECTIVE / OVERNIGHT EVENTS: Pt reports no events overnight. Reports mild non radiating left sided lower thorax pain similar to previous pain on admission. Denies dyspnea, fever, n/v/d, abd pain, leg pain.    MEDICATIONS  (STANDING):  sodium chloride 0.9%. 1000 milliLiter(s) (150 mL/Hr) IV Continuous <Continuous>  levoFLOXacin  Tablet 500 milliGRAM(s) Oral every 24 hours    MEDICATIONS  (PRN):  acetaminophen   Tablet 650 milliGRAM(s) Oral every 6 hours PRN For Temp greater than 38 C (100.4 F)  morphine  - Injectable 2 milliGRAM(s) IV Push every 4 hours PRN Severe Pain (7 - 10)  ALBUTerol/ipratropium for Nebulization 3 milliLiter(s) Nebulizer every 6 hours PRN Shortness of Breath and/or Wheezing  benzonatate 100 milliGRAM(s) Oral every 8 hours PRN Cough  ondansetron Injectable 4 milliGRAM(s) IV Push every 4 hours PRN Nausea and/or Vomiting  acetaminophen   Tablet. 650 milliGRAM(s) Oral every 6 hours PRN pain  melatonin 3 milliGRAM(s) Oral at bedtime PRN Insomnia      T(C): 36.8 (07-22-17 @ 06:27), Max: 37.4 (07-21-17 @ 14:57)  HR: 60 (07-22-17 @ 06:27) (60 - 69)  BP: 121/77 (07-22-17 @ 06:27) (121/77 - 124/87)  RR: 18 (07-22-17 @ 06:27) (17 - 18)  SpO2: 98% (07-22-17 @ 06:27) (18% - 99%)  CAPILLARY BLOOD GLUCOSE        I&O's Summary    22 Jul 2017 07:01  -  22 Jul 2017 08:17  --------------------------------------------------------  IN: 0 mL / OUT: 800 mL / NET: -800 mL        PHYSICAL EXAM  GENERAL: NAD, well-developed  HEAD:  Atraumatic, Normocephalic  EYES: EOMI, PERRLA, conjunctiva and sclera clear  NECK: Supple, No JVD  CHEST/LUNG: left base crackles  HEART: Regular rate and rhythm; No murmurs, rubs, or gallops  ABDOMEN: Soft, Nontender, Nondistended; Bowel sounds present  EXTREMITIES:  2+ Peripheral Pulses, No clubbing, cyanosis, or edema  PSYCH: AAOx3  SKIN: No rashes or lesions    LABS:                        12.2   11.51   )-----------( 281      ( 22 Jul 2017 07:38 )             36.4     07-21    141  |  103  |  12  ----------------------------<  75  4.0   |  24  |  1.07    Ca    9.1      21 Jul 2017 05:00  Phos  3.0     07-21  Mg     1.7     07-21      RADIOLOGY & ADDITIONAL TESTS:    Imaging Personally Reviewed:  Consultant(s) Notes Reviewed:    Care Discussed with Consultants/Other Providers:

## 2017-07-22 NOTE — PROGRESS NOTE ADULT - PROBLEM SELECTOR PLAN 2
- Sepsis 2/2 CAP PNA in setting of complement deficiency   - bl cx- NGTD 4 - Sepsis 2/2 CAP PNA in setting of complement deficiency   - bl cx- NGTD

## 2017-07-23 VITALS
OXYGEN SATURATION: 99 % | DIASTOLIC BLOOD PRESSURE: 80 MMHG | RESPIRATION RATE: 18 BRPM | TEMPERATURE: 99 F | SYSTOLIC BLOOD PRESSURE: 124 MMHG | HEART RATE: 68 BPM

## 2017-07-23 DIAGNOSIS — Z29.9 ENCOUNTER FOR PROPHYLACTIC MEASURES, UNSPECIFIED: ICD-10-CM

## 2017-07-23 LAB
BACTERIA BLD CULT: SIGNIFICANT CHANGE UP
BACTERIA BLD CULT: SIGNIFICANT CHANGE UP
BASOPHILS # BLD AUTO: 0.08 K/UL — SIGNIFICANT CHANGE UP (ref 0–0.2)
BASOPHILS NFR BLD AUTO: 0.7 % — SIGNIFICANT CHANGE UP (ref 0–2)
BUN SERPL-MCNC: 16 MG/DL — SIGNIFICANT CHANGE UP (ref 7–23)
CALCIUM SERPL-MCNC: 9.1 MG/DL — SIGNIFICANT CHANGE UP (ref 8.4–10.5)
CHLORIDE SERPL-SCNC: 103 MMOL/L — SIGNIFICANT CHANGE UP (ref 98–107)
CO2 SERPL-SCNC: 25 MMOL/L — SIGNIFICANT CHANGE UP (ref 22–31)
CREAT SERPL-MCNC: 1.05 MG/DL — SIGNIFICANT CHANGE UP (ref 0.5–1.3)
EOSINOPHIL # BLD AUTO: 0.28 K/UL — SIGNIFICANT CHANGE UP (ref 0–0.5)
EOSINOPHIL NFR BLD AUTO: 2.4 % — SIGNIFICANT CHANGE UP (ref 0–6)
GLUCOSE SERPL-MCNC: 78 MG/DL — SIGNIFICANT CHANGE UP (ref 70–99)
HCT VFR BLD CALC: 38.2 % — LOW (ref 39–50)
HCT VFR BLD CALC: 38.2 % — LOW (ref 39–50)
HGB BLD-MCNC: 12.5 G/DL — LOW (ref 13–17)
HGB BLD-MCNC: 12.5 G/DL — LOW (ref 13–17)
IMM GRANULOCYTES # BLD AUTO: 0.28 # — SIGNIFICANT CHANGE UP
IMM GRANULOCYTES NFR BLD AUTO: 2.4 % — HIGH (ref 0–1.5)
LYMPHOCYTES # BLD AUTO: 2.93 K/UL — SIGNIFICANT CHANGE UP (ref 1–3.3)
LYMPHOCYTES # BLD AUTO: 25.2 % — SIGNIFICANT CHANGE UP (ref 13–44)
MAGNESIUM SERPL-MCNC: 1.6 MG/DL — SIGNIFICANT CHANGE UP (ref 1.6–2.6)
MANUAL SMEAR VERIFICATION: SIGNIFICANT CHANGE UP
MANUAL SMEAR VERIFICATION: SIGNIFICANT CHANGE UP
MCHC RBC-ENTMCNC: 28.7 PG — SIGNIFICANT CHANGE UP (ref 27–34)
MCHC RBC-ENTMCNC: 28.7 PG — SIGNIFICANT CHANGE UP (ref 27–34)
MCHC RBC-ENTMCNC: 32.7 % — SIGNIFICANT CHANGE UP (ref 32–36)
MCHC RBC-ENTMCNC: 32.7 % — SIGNIFICANT CHANGE UP (ref 32–36)
MCV RBC AUTO: 87.8 FL — SIGNIFICANT CHANGE UP (ref 80–100)
MCV RBC AUTO: 87.8 FL — SIGNIFICANT CHANGE UP (ref 80–100)
MONOCYTES # BLD AUTO: 1.64 K/UL — HIGH (ref 0–0.9)
MONOCYTES NFR BLD AUTO: 14.1 % — HIGH (ref 2–14)
NEUTROPHILS # BLD AUTO: 6.4 K/UL — SIGNIFICANT CHANGE UP (ref 1.8–7.4)
NEUTROPHILS NFR BLD AUTO: 55.2 % — SIGNIFICANT CHANGE UP (ref 43–77)
NRBC # FLD: 0 — SIGNIFICANT CHANGE UP
NRBC # FLD: 0 — SIGNIFICANT CHANGE UP
PHOSPHATE SERPL-MCNC: 3.7 MG/DL — SIGNIFICANT CHANGE UP (ref 2.5–4.5)
PLATELET # BLD AUTO: 297 K/UL — SIGNIFICANT CHANGE UP (ref 150–400)
PLATELET # BLD AUTO: 297 K/UL — SIGNIFICANT CHANGE UP (ref 150–400)
PMV BLD: 9.3 FL — SIGNIFICANT CHANGE UP (ref 7–13)
PMV BLD: 9.3 FL — SIGNIFICANT CHANGE UP (ref 7–13)
POTASSIUM SERPL-MCNC: 4 MMOL/L — SIGNIFICANT CHANGE UP (ref 3.5–5.3)
POTASSIUM SERPL-SCNC: 4 MMOL/L — SIGNIFICANT CHANGE UP (ref 3.5–5.3)
RBC # BLD: 4.35 M/UL — SIGNIFICANT CHANGE UP (ref 4.2–5.8)
RBC # BLD: 4.35 M/UL — SIGNIFICANT CHANGE UP (ref 4.2–5.8)
RBC # FLD: 11.7 % — SIGNIFICANT CHANGE UP (ref 10.3–14.5)
RBC # FLD: 11.7 % — SIGNIFICANT CHANGE UP (ref 10.3–14.5)
SODIUM SERPL-SCNC: 141 MMOL/L — SIGNIFICANT CHANGE UP (ref 135–145)
WBC # BLD: 11.39 K/UL — HIGH (ref 3.8–10.5)
WBC # BLD: 11.39 K/UL — HIGH (ref 3.8–10.5)
WBC # FLD AUTO: 11.39 K/UL — HIGH (ref 3.8–10.5)
WBC # FLD AUTO: 11.39 K/UL — HIGH (ref 3.8–10.5)

## 2017-07-23 PROCEDURE — 99239 HOSP IP/OBS DSCHRG MGMT >30: CPT

## 2017-07-23 RX ORDER — MAGNESIUM OXIDE 400 MG ORAL TABLET 241.3 MG
400 TABLET ORAL
Qty: 0 | Refills: 0 | Status: CANCELLED | OUTPATIENT
Start: 2018-06-22 | End: 2017-07-23

## 2017-07-23 RX ORDER — MAGNESIUM OXIDE 400 MG ORAL TABLET 241.3 MG
400 TABLET ORAL
Qty: 0 | Refills: 0 | Status: DISCONTINUED | OUTPATIENT
Start: 2017-07-23 | End: 2017-07-23

## 2017-07-23 RX ORDER — IBUPROFEN 200 MG
1 TABLET ORAL
Qty: 21 | Refills: 0 | OUTPATIENT
Start: 2017-07-23 | End: 2017-07-30

## 2017-07-23 RX ADMIN — MORPHINE SULFATE 2 MILLIGRAM(S): 50 CAPSULE, EXTENDED RELEASE ORAL at 06:36

## 2017-07-23 RX ADMIN — MORPHINE SULFATE 2 MILLIGRAM(S): 50 CAPSULE, EXTENDED RELEASE ORAL at 06:51

## 2017-07-23 RX ADMIN — Medication 3 MILLIGRAM(S): at 01:22

## 2017-07-23 NOTE — PROGRESS NOTE ADULT - PROBLEM SELECTOR PLAN 2
- Sepsis 2/2 CAP PNA in setting of complement deficiency   - bl cx- NGTD Pediatrician reports complement def in alternate b pathway: records show AH50 at 0  -allergy/immunology  advised same txt for CAP, no other intervention  -will require outpatient f/u at [facility] clinic for prophylaxis and management, as per   - will arrange for outpatient A/I follow-up for patient prior to discharge as pt current reports not following up with a specialist in the community

## 2017-07-23 NOTE — PROGRESS NOTE ADULT - PROBLEM SELECTOR PLAN 3
-pediatrician reports complement def in alternate b pathway: records show AH50 at 0  -allergy/imm advised same txt for CAP, no other intervention  -will require outpatient f/u at  clinic for prophylaxis and management, as per AI  -total hemolytic complement elevated DVT: Improve Score of 0     DISPO: d/c home today w/ instruction to complete course of PO abx.

## 2017-07-23 NOTE — PROGRESS NOTE ADULT - PROBLEM SELECTOR PLAN 1
- Sepsis 2/2 PNA in setting of complement deficiency   -leukocytosis improving, at 11.5  - Levaquin PO day 2(of 5)  - blood culture NGTD Pt a/w severe sepsis 2/2 community acquired PNA. Leukocytosis w/ bandemia improving and pt has remained afebrile. On review of outpatient notes, appears pt has a mild variant of complement deficiency, however has suffered severe infections in the past. Per A/I specialist, c/t treatment of pt's current CAP infection with traditional antibiotics as no need for any additional coverage.   - Levaquin PO day 3 of 5 day course ; will d/c home today with instruction to complete 2 more days of PO antibiotics   - blood culture NGTD

## 2017-07-23 NOTE — PROGRESS NOTE ADULT - SUBJECTIVE AND OBJECTIVE BOX
CC: Patient is a 26y old  Male who presents with a chief complaint of Fever (21 Jul 2017 14:16)      SUBJECTIVE / OVERNIGHT EVENTS: Pt reports no events overnight. Reports mild non radiating left sided lower thorax pain similar to previous pain on admission. Denies dyspnea, fever, n/v/d, abd pain, leg pain.    MEDICATIONS  (STANDING):  sodium chloride 0.9%. 1000 milliLiter(s) (150 mL/Hr) IV Continuous <Continuous>  levoFLOXacin  Tablet 500 milliGRAM(s) Oral every 24 hours    MEDICATIONS  (PRN):  acetaminophen   Tablet 650 milliGRAM(s) Oral every 6 hours PRN For Temp greater than 38 C (100.4 F)  morphine  - Injectable 2 milliGRAM(s) IV Push every 4 hours PRN Severe Pain (7 - 10)  ALBUTerol/ipratropium for Nebulization 3 milliLiter(s) Nebulizer every 6 hours PRN Shortness of Breath and/or Wheezing  benzonatate 100 milliGRAM(s) Oral every 8 hours PRN Cough  ondansetron Injectable 4 milliGRAM(s) IV Push every 4 hours PRN Nausea and/or Vomiting  acetaminophen   Tablet. 650 milliGRAM(s) Oral every 6 hours PRN pain  melatonin 3 milliGRAM(s) Oral at bedtime PRN Insomnia    VS  T(C): 37.1 (07-23-17 @ 06:30)  HR: 66 (07-23-17 @ 06:30)  BP: 129/92 (07-23-17 @ 06:30)  RR: 18 (07-23-17 @ 06:30)  SpO2: 96% (07-23-17 @ 06:30)      PHYSICAL EXAM  GENERAL: NAD, well-developed  HEENT: Atraumatic, Normocephalic; EOMI, PERRLA, conjunctiva and sclera clear  NECK: Supple, No JVD  CHEST/LUNG: left base crackles  HEART: Regular rate and rhythm; No murmurs, rubs, or gallops  ABDOMEN: Soft, Nontender, Nondistended; Bowel sounds present  EXTREMITIES:  2+ Peripheral Pulses, No clubbing, cyanosis, or edema  PSYCH: AAOx3  NEURO: no focal deficiets, strength/sensation grossly intact   SKIN: No rashes or lesions      LABS: 7/23-pending       Consultant(s) Notes Reviewed:    Care Discussed with Consultants/Other Providers: Allergy/Immunology (house); Pulmonology (House) CC: Patient is a 26y old  Male who presents with a chief complaint of Fever (21 Jul 2017 14:16)      SUBJECTIVE / OVERNIGHT EVENTS: Pt reports no events overnight. Reports pain on left side of chest is improved since yesterday. Denies any subjective fevers, chills no shaking rigors. Denies any pleuritic CP, no SOB/SARAVIA, and states he feels comfortable breathing on room air.     MEDICATIONS  (STANDING):  sodium chloride 0.9%. 1000 milliLiter(s) (150 mL/Hr) IV Continuous <Continuous>  levoFLOXacin  Tablet 500 milliGRAM(s) Oral every 24 hours    MEDICATIONS  (PRN):  acetaminophen   Tablet 650 milliGRAM(s) Oral every 6 hours PRN For Temp greater than 38 C (100.4 F)  morphine  - Injectable 2 milliGRAM(s) IV Push every 4 hours PRN Severe Pain (7 - 10)  ALBUTerol/ipratropium for Nebulization 3 milliLiter(s) Nebulizer every 6 hours PRN Shortness of Breath and/or Wheezing  benzonatate 100 milliGRAM(s) Oral every 8 hours PRN Cough  ondansetron Injectable 4 milliGRAM(s) IV Push every 4 hours PRN Nausea and/or Vomiting  acetaminophen   Tablet. 650 milliGRAM(s) Oral every 6 hours PRN pain  melatonin 3 milliGRAM(s) Oral at bedtime PRN Insomnia    VS  T(C): 37.1 (07-23-17 @ 06:30)  HR: 66 (07-23-17 @ 06:30)  BP: 129/92 (07-23-17 @ 06:30)  RR: 18 (07-23-17 @ 06:30)  SpO2: 96% (07-23-17 @ 06:30)      PHYSICAL EXAM  GENERAL: NAD, well-developed  HEENT: Atraumatic, Normocephalic; EOMI, PERRLA, conjunctiva and sclera clear  NECK: Supple, No JVD  CHEST/LUNG: left base crackles  HEART: Regular rate and rhythm; No murmurs, rubs, or gallops  ABDOMEN: Soft, Nontender, Nondistended; Bowel sounds present  EXTREMITIES:  2+ Peripheral Pulses, No clubbing, cyanosis, or edema  PSYCH: AAOx3  NEURO: no focal deficiets, strength/sensation grossly intact   SKIN: No rashes or lesions      LABS:                         12.5   11.39 )-----------( 297      ( 23 Jul 2017 06:10 )             38.2     23 Jul 2017 06:10    141    |  103    |  16     ----------------------------<  78     4.0     |  25     |  1.05     Ca    9.1        23 Jul 2017 06:10  Phos  3.7       23 Jul 2017 06:10  Mg     1.6       23 Jul 2017 06:10      Care Discussed with Consultants/Other Providers: Allergy/Immunology (house); Pulmonology ([place])

## 2017-07-23 NOTE — PROGRESS NOTE ADULT - NSHPATTENDINGPLANDISCUSS_GEN_ALL_CORE
Patient, Housestaff
Patient, [facility]
Patient, Dr. Yu
Housestaff, SW/CM, Patient

## 2017-07-23 NOTE — PROGRESS NOTE ADULT - ATTENDING COMMENTS
I personally saw and evaluated the patient at bedside. The patient is a 27 yo M with hx of complement deficiency (unclear what type), hx of PNA (unknown which lobe) with presumed empyema requiring chest tube drainage roughly 10 years ago. Patient reports feeling significantly improved since coming into the hospital. Denies any SOB, CP, cough, URI symptoms at this time. He reports feeling comfortable as well. Patient reports that he has not seen a physician in years but that he got "shots" when he was younger that "cured" his complement deficiency. Unclear what this would be. Will f/u with patient's prior pediatrician to determine details of prior pneumonia episode requiring chest tube. Patient still with profound neutrophilia but no further bandemia and is afebrile. Will send immunoglobulin and complement levels with AM labs to determine status of his immune system. May need A/I consult pending results. Repeat lactate in AM. If CBC not improving in AM may require further chest imaging.
I personally saw and evaluated the patient. Patient well appearing today and afebrile but had recurrent bandemia on labs yesterday. If stable in AM, anticipate dc home tomorrow to complete 5 day course of abx will Levaquin. Spoke with allergy and immunology - who state patient will need outpatient f/u with them in the upcoming weeks. Will make appt for patient prior to 8/11/17.
I personally saw and evaluated the patient. Patient clinically continues to improve, denies CP, SOB, +cough and some scant hemoptysis. Mother concerned regarding hemoptysis. I discussed with her that hemoptysis likely in setting of CAP and that it would resolve with PNA. She does report, however, that patient has had recurrent infections when he was younger in the [place] and that he has C1 deficiency. Given mother's extreme concern about hemoptysis, pulm called, in agreement that the hemoptysis is related to CAP. Will change to po Levaquin. If patient continues to improve clinically and leukocytosis continues to improve, will consider dc in AM to complete abx course po. Case d/w Allergy/Immunology, report that patient's immunologic deficiency does not have a cure but will need outpatient f/u. Patient looses insurance on 8/11, will attempt to schedule an appointment with them prior to that.
Pt doing well, stable for dc home today as bandemia has resolved. Should complete abx course with Levaquin and have follow-up with A/I in 2 weeks. Team to make an appt for patient in AM with allergy and immunology.     DISCHARGE TIME: 35 mins
I personally saw and evaluated the patient at bedside with team. The patient is a 25 yo M with hx of complement deficiency (unclear what type-possible alternative pathway), hx of PNA (unknown lobe but also on left) with presumed empyema requiring chest tube drainage roughly 10 years ago. Patient reports he continues to feel better and continues to deny any SOB, CP, cough, URI symptoms at this time. He looks comfortable as well and lungs are clear. Leukocytosis improving, bandemia resolved. C3/C4/CH50/AH50/immunoglobulins sent for further evaluation of complement deficiency. Still await pediatrician records. Allergy/Immunology consulted for inpatient evaluation as he has not seen a physician in ten years. Family to bring in some prior records as well.

## 2017-07-23 NOTE — PROGRESS NOTE ADULT - ASSESSMENT
25 yo M with h/o complement deficiency, asthma, prior PNA c/b chest tube placement 10 yrs ago p/w fever, cough and SOB 2/2 severe sepsis 2/2 CAP. Abx Day 5, Levaquin PO day 2(of 5) 27 yo M with h/o complement deficiency, asthma, prior PNA c/b chest tube placement 10 yrs ago p/w fever, cough and SOB 2/2 severe sepsis 2/2 CAP. Pt with bandmeia on labs, now resolved. Pt now clinically improved, HD stable and medically optimized for discharge home with close outpatient follow-up.

## 2017-07-25 LAB
C DIPHTHERIAE AB SER-ACNC: 1.37 IU/ML — SIGNIFICANT CHANGE UP
DEPRECATED S PNEUM 1 IGG SER-MCNC: 18.1 MCG/ML — SIGNIFICANT CHANGE UP
DEPRECATED S PNEUM12 IGG SER-MCNC: 0.2 MCG/ML — SIGNIFICANT CHANGE UP
DEPRECATED S PNEUM14 IGG SER-MCNC: 3.9 MCG/ML — SIGNIFICANT CHANGE UP
DEPRECATED S PNEUM17 IGG SER-MCNC: 0.7 MCG/ML — SIGNIFICANT CHANGE UP
DEPRECATED S PNEUM19 IGG SER-MCNC: 2.4 MCG/ML — SIGNIFICANT CHANGE UP
DEPRECATED S PNEUM19 IGG SER-MCNC: 7.4 MCG/ML — SIGNIFICANT CHANGE UP
DEPRECATED S PNEUM2 IGG SER-MCNC: 0.9 MCG/ML — SIGNIFICANT CHANGE UP
DEPRECATED S PNEUM20 IGG SER-MCNC: 2.7 MCG/ML — SIGNIFICANT CHANGE UP
DEPRECATED S PNEUM22 IGG SER-MCNC: 3.3 MCG/ML — SIGNIFICANT CHANGE UP
DEPRECATED S PNEUM23 IGG SER-MCNC: 4.9 MCG/ML — SIGNIFICANT CHANGE UP
DEPRECATED S PNEUM3 IGG SER-MCNC: 0.5 MCG/ML — SIGNIFICANT CHANGE UP
DEPRECATED S PNEUM5 IGG SER-MCNC: 0.7 MCG/ML — SIGNIFICANT CHANGE UP
DEPRECATED S PNEUM8 IGG SER-MCNC: 0.5 MCG/ML — SIGNIFICANT CHANGE UP
DEPRECATED S PNEUM9 IGG SER-MCNC: 0.3 MCG/ML — SIGNIFICANT CHANGE UP
DEPRECATED S PNEUM9 IGG SER-MCNC: 6 MCG/ML — SIGNIFICANT CHANGE UP
HAEM INFLU B AB SER-MCNC: 0.52 MG/L — SIGNIFICANT CHANGE UP
IGG1 SER-MCNC: 772 MG/DL — SIGNIFICANT CHANGE UP (ref 341–894)
IGG2 SER-MCNC: 423 MG/DL — SIGNIFICANT CHANGE UP (ref 171–632)
IGG3 SER-MCNC: 59.6 MG/DL — SIGNIFICANT CHANGE UP (ref 18.4–106)
IGG4 SER-MCNC: 16.8 MG/DL — SIGNIFICANT CHANGE UP (ref 2.4–121)
S PNEUM SEROTYPE IGG SER-IMP: 0.4 MCG/ML — SIGNIFICANT CHANGE UP
S PNEUM SEROTYPE IGG SER-IMP: 0.6 MCG/ML — SIGNIFICANT CHANGE UP
S PNEUM SEROTYPE IGG SER-IMP: 0.8 MCG/ML — SIGNIFICANT CHANGE UP
S PNEUM SEROTYPE IGG SER-IMP: 0.9 MCG/ML — SIGNIFICANT CHANGE UP
S PNEUM SEROTYPE IGG SER-IMP: 2.2 MCG/ML — SIGNIFICANT CHANGE UP
S PNEUM SEROTYPE IGG SER-IMP: 2.2 MCG/ML — SIGNIFICANT CHANGE UP
S PNEUM SEROTYPE IGG SER-IMP: SIGNIFICANT CHANGE UP MCG/ML
TETANUS ANTIBODIES IGG: 1.58 IU/ML — SIGNIFICANT CHANGE UP

## 2017-08-01 ENCOUNTER — APPOINTMENT (OUTPATIENT)
Dept: PEDIATRIC ALLERGY IMMUNOLOGY | Facility: CLINIC | Age: 27
End: 2017-08-01

## 2018-01-01 NOTE — ED PROVIDER NOTE - NS ED MD EM SELECTION
Pt presents to the ED after pt was given juice and was choking for approx 6-10 seconds, pt father administered back thrusts and pt was no longer in distress. Pt in no distress at triage. [id number] [facility]

## 2020-01-06 ENCOUNTER — EMERGENCY (EMERGENCY)
Facility: HOSPITAL | Age: 30
LOS: 1 days | Discharge: HOME | End: 2020-01-06
Attending: EMERGENCY MEDICINE
Payer: SELF-PAY

## 2020-01-06 VITALS
OXYGEN SATURATION: 99 % | HEIGHT: 66 IN | HEART RATE: 68 BPM | TEMPERATURE: 98 F | RESPIRATION RATE: 16 BRPM | WEIGHT: 149.91 LBS | SYSTOLIC BLOOD PRESSURE: 133 MMHG | DIASTOLIC BLOOD PRESSURE: 86 MMHG

## 2020-01-06 DIAGNOSIS — Z98.890 OTHER SPECIFIED POSTPROCEDURAL STATES: Chronic | ICD-10-CM

## 2020-01-06 PROCEDURE — 99285 EMERGENCY DEPT VISIT HI MDM: CPT

## 2020-01-06 NOTE — ED ADULT TRIAGE NOTE - CHIEF COMPLAINT QUOTE
" I was seen in Urgent care last Friday and I am on antibiotic Amoxicillin and I still have body aches and chest congestion '

## 2020-01-07 PROBLEM — D84.1 DEFECTS IN THE COMPLEMENT SYSTEM: Chronic | Status: ACTIVE | Noted: 2017-07-18

## 2020-01-07 PROCEDURE — 71046 X-RAY EXAM CHEST 2 VIEWS: CPT | Mod: 26

## 2020-01-07 PROCEDURE — 99283 EMERGENCY DEPT VISIT LOW MDM: CPT | Mod: 25

## 2020-01-07 PROCEDURE — 93005 ELECTROCARDIOGRAM TRACING: CPT

## 2020-01-07 PROCEDURE — 71046 X-RAY EXAM CHEST 2 VIEWS: CPT

## 2020-01-07 RX ORDER — ACETAMINOPHEN 500 MG
2 TABLET ORAL
Qty: 20 | Refills: 0
Start: 2020-01-07

## 2020-01-07 RX ORDER — IBUPROFEN 200 MG
600 TABLET ORAL ONCE
Refills: 0 | Status: COMPLETED | OUTPATIENT
Start: 2020-01-07 | End: 2020-01-07

## 2020-01-07 RX ADMIN — Medication 600 MILLIGRAM(S): at 02:26

## 2020-01-07 NOTE — ED PROVIDER NOTE - NSFOLLOWUPINSTRUCTIONS_ED_ALL_ED_FT
Return to ER immediately if you feel short of breath, have chest pain, fever, coughing worsens, vomiting, weakness any concerns. Take medications as instructed if they were prescribed.  See your primary doctor as soon as possible (1-2 days). If you need assistance with follow up appointment, you can contact our Care Coordinator at [phone].

## 2020-01-07 NOTE — ED PROVIDER NOTE - PSYCHIATRIC, MLM
[facility]    WRITTEN HEALTHCARE AND DISCHARGE INFORMATION    Follow-up Information     Lore Rodriguez NP In 1 week.    Specialty:  Family Medicine  Contact information:  [address] [facility]  [place] [address]  [phone]             Hans Ren MD In 2 weeks.    Specialty:  Gastroenterology  Contact information:  [address] [place] [address]  [address]  [facility]  [place] [address]  [phone]                   Help at Home           [phone]  After discharge for assistance [facility] On Call Nurse Care Line 24/7  Assistance     Things You are responsible For To Manage Your Care At Home:  1.    Getting your prescriptions filled   2.    Taking your medications as directed, DO NOT MISS ANY DOSES!  3.    Going to your follow-up doctor appointment. This is important because it  allow the doctor to monitor your progress and determine if  any changes need to made to your treatment plan.     Thank you for choosing [facility] for your care.  Please answer any calls you may receive from [facility] we want to continue to support you as you manage your healthcare needs. [facility] is happy to have the opportunity to serve you.      Sincerely,  Your [facility] Team,  Maddi CORONADO,[facility]   [phone]               Alert and oriented to person, place, time/situation. normal mood and affect. no apparent risk to self or others.

## 2020-01-07 NOTE — ED PROVIDER NOTE - OBJECTIVE STATEMENT
Chief complaint of fever, chills, dry cough x 2 days worsening yesterday. pt was evaluated at  at [facility] 2 dyas ago and started on Amox due to frequent hx of pneumonia.

## 2020-01-07 NOTE — ED PROVIDER NOTE - NSFOLLOWUPCLINICS_GEN_ALL_ED_FT
[facility]  Multi Specialty Office  [address] - [address] [place] [address]  Phone: [phone]  Fax: [phone]  Follow Up Time:

## 2020-01-07 NOTE — ED PROVIDER NOTE - PATIENT PORTAL LINK FT
You can access the FollowMyHealth Patient Portal offered by [facility] by registering at the following website: [email or web address]. By joining [employer]’s FollowMyHealth portal, you will also be able to view your health information using other applications (apps) compatible with our system.

## 2020-01-07 NOTE — ED PROVIDER NOTE - CLINICAL SUMMARY MEDICAL DECISION MAKING FREE TEXT BOX
Pt symptoms cw influenza. Pt is well appearing, has no new complaints and able to walk with normal gait. Pt is stable for discharge and follow up with medical doctor. Pt educated on care and need for follow up. Discussed anticipatory guidance and return precautions. Questions answered. I had a detailed discussion with the patient and/or guardian regarding the historical points, exam findings, and any diagnostic results supporting the discharge diagnosis.   Rx Tamiflu, Tylenol.

## 2021-06-24 NOTE — H&P ADULT - NSHPLABSRESULTS_GEN_ALL_CORE
[facility] filled out and signed by Justa PERES to have kit mailed to patient.    Called and notified patient and recommendations regarding returning kit.    [facility] progress note[facility] [phone]  
.  LABS:                         15.5   35.71 )-----------( 285      ( 18 Jul 2017 18:31 )             44.6     07-18    134<L>  |  93<L>  |  23  ----------------------------<  98  4.6   |  27  |  1.86<H>    Ca    9.6      18 Jul 2017 18:31    TPro  8.3  /  Alb  4.3  /  TBili  3.2<H>  /  DBili  x   /  AST  31  /  ALT  29  /  AlkPhos  63  07-18    PT/INR - ( 18 Jul 2017 18:31 )   PT: 15.2 SEC;   INR: 1.35          PTT - ( 18 Jul 2017 18:31 )  PTT:36.6 SEC    CARDIAC MARKERS ( 18 Jul 2017 18:31 )  x     / < 0.06 ng/mL / 118 u/L / 1.00 ng/mL / x        RADIOLOGY, EKG & ADDITIONAL TESTS: Reviewed.

## 2021-09-01 NOTE — ED ADULT NURSE NOTE - CHPI ED NUR SYMPTOMS NEG
no dizziness/no fever/no decreased eating/drinking/no weakness/no nausea/no vomiting/no chills/no tingling 0

## 2025-04-26 NOTE — ED ADULT NURSE NOTE - NS_SISCREENINGSR_GEN_ALL_ED
Per PT there was no changes to dosage, sig or quantity.  The medication(s) are set up as pending and waiting for your approval.  Preferred pharmacy was set up and verified.   Received refill request for ritalin  Last office visit: 12/13/18  Last filled 4/17/19    According to the [facility] refill policy, Jamar Gonsales's Prescription refill request  not approved due to class, will forward to PCP    Yes Negative